# Patient Record
Sex: FEMALE | Race: BLACK OR AFRICAN AMERICAN | NOT HISPANIC OR LATINO | Employment: OTHER | URBAN - METROPOLITAN AREA
[De-identification: names, ages, dates, MRNs, and addresses within clinical notes are randomized per-mention and may not be internally consistent; named-entity substitution may affect disease eponyms.]

---

## 2018-07-16 ENCOUNTER — ANESTHESIA (OUTPATIENT)
Dept: SURGERY | Facility: HOSPITAL | Age: 53
DRG: 268 | End: 2018-07-16
Payer: MEDICARE

## 2018-07-16 ENCOUNTER — HOSPITAL ENCOUNTER (INPATIENT)
Facility: HOSPITAL | Age: 53
LOS: 2 days | Discharge: HOME | DRG: 268 | End: 2018-07-18
Attending: SURGERY | Admitting: SURGERY
Payer: MEDICARE

## 2018-07-16 ENCOUNTER — ANESTHESIA EVENT (OUTPATIENT)
Dept: SURGERY | Facility: HOSPITAL | Age: 53
DRG: 268 | End: 2018-07-16
Payer: MEDICARE

## 2018-07-16 DIAGNOSIS — E43 SEVERE PROTEIN-CALORIE MALNUTRITION: ICD-10-CM

## 2018-07-16 DIAGNOSIS — Z99.2 ESRD ON HEMODIALYSIS: Primary | ICD-10-CM

## 2018-07-16 DIAGNOSIS — I77.0: ICD-10-CM

## 2018-07-16 DIAGNOSIS — I73.9 PERIPHERAL ARTERIAL DISEASE: ICD-10-CM

## 2018-07-16 DIAGNOSIS — I50.9 CHF (CONGESTIVE HEART FAILURE): ICD-10-CM

## 2018-07-16 DIAGNOSIS — N18.6 ESRD ON HEMODIALYSIS: Primary | ICD-10-CM

## 2018-07-16 DIAGNOSIS — R10.13 ABDOMINAL PAIN, EPIGASTRIC: ICD-10-CM

## 2018-07-16 DIAGNOSIS — I71.40 ABDOMINAL AORTIC ANEURYSM (AAA) WITHOUT RUPTURE: ICD-10-CM

## 2018-07-16 DIAGNOSIS — I71.40 AAA (ABDOMINAL AORTIC ANEURYSM): ICD-10-CM

## 2018-07-16 DIAGNOSIS — N18.6 END STAGE RENAL DISEASE: ICD-10-CM

## 2018-07-16 LAB
ABO + RH BLD: NORMAL
ALBUMIN SERPL BCP-MCNC: 3.2 G/DL
ALP SERPL-CCNC: 97 U/L
ALT SERPL W/O P-5'-P-CCNC: <5 U/L
ANION GAP SERPL CALC-SCNC: 15 MMOL/L
APTT BLDCRRT: 28.4 SEC
AST SERPL-CCNC: 17 U/L
BASOPHILS # BLD AUTO: 0.02 K/UL
BASOPHILS NFR BLD: 0.5 %
BILIRUB SERPL-MCNC: 0.6 MG/DL
BLD GP AB SCN CELLS X3 SERPL QL: NORMAL
BUN SERPL-MCNC: 24 MG/DL
CALCIUM SERPL-MCNC: 8.7 MG/DL
CHLORIDE SERPL-SCNC: 98 MMOL/L
CO2 SERPL-SCNC: 21 MMOL/L
CREAT SERPL-MCNC: 7.8 MG/DL
DIFFERENTIAL METHOD: ABNORMAL
EOSINOPHIL # BLD AUTO: 0.2 K/UL
EOSINOPHIL NFR BLD: 4.3 %
ERYTHROCYTE [DISTWIDTH] IN BLOOD BY AUTOMATED COUNT: 17.8 %
EST. GFR  (AFRICAN AMERICAN): 6.2 ML/MIN/1.73 M^2
EST. GFR  (NON AFRICAN AMERICAN): 5.3 ML/MIN/1.73 M^2
GLUCOSE SERPL-MCNC: 61 MG/DL
HCT VFR BLD AUTO: 42.1 %
HGB BLD-MCNC: 12.3 G/DL
IMM GRANULOCYTES # BLD AUTO: 0.01 K/UL
IMM GRANULOCYTES NFR BLD AUTO: 0.3 %
INR PPP: 1
LYMPHOCYTES # BLD AUTO: 0.8 K/UL
LYMPHOCYTES NFR BLD: 19.4 %
MAGNESIUM SERPL-MCNC: 2.2 MG/DL
MCH RBC QN AUTO: 28 PG
MCHC RBC AUTO-ENTMCNC: 29.2 G/DL
MCV RBC AUTO: 96 FL
MONOCYTES # BLD AUTO: 0.5 K/UL
MONOCYTES NFR BLD: 11.7 %
NEUTROPHILS # BLD AUTO: 2.5 K/UL
NEUTROPHILS NFR BLD: 63.8 %
NRBC BLD-RTO: 0 /100 WBC
PHOSPHATE SERPL-MCNC: 5.3 MG/DL
PLATELET # BLD AUTO: 100 K/UL
PMV BLD AUTO: 11 FL
POTASSIUM SERPL-SCNC: 4.4 MMOL/L
PROT SERPL-MCNC: 8.8 G/DL
PROTHROMBIN TIME: 10.2 SEC
RBC # BLD AUTO: 4.4 M/UL
SODIUM SERPL-SCNC: 134 MMOL/L
WBC # BLD AUTO: 3.92 K/UL

## 2018-07-16 PROCEDURE — C1887 CATHETER, GUIDING: HCPCS | Performed by: SURGERY

## 2018-07-16 PROCEDURE — 84100 ASSAY OF PHOSPHORUS: CPT

## 2018-07-16 PROCEDURE — 85610 PROTHROMBIN TIME: CPT

## 2018-07-16 PROCEDURE — C1769 GUIDE WIRE: HCPCS | Performed by: SURGERY

## 2018-07-16 PROCEDURE — 34705 EVAC RPR A-BIILIAC NDGFT: CPT | Mod: GC,,, | Performed by: SURGERY

## 2018-07-16 PROCEDURE — D9220A PRA ANESTHESIA: Mod: ANES,,, | Performed by: ANESTHESIOLOGY

## 2018-07-16 PROCEDURE — 36000707: Performed by: SURGERY

## 2018-07-16 PROCEDURE — D9220A PRA ANESTHESIA: Mod: CRNA,,, | Performed by: NURSE ANESTHETIST, CERTIFIED REGISTERED

## 2018-07-16 PROCEDURE — 37000008 HC ANESTHESIA 1ST 15 MINUTES: Performed by: SURGERY

## 2018-07-16 PROCEDURE — 85730 THROMBOPLASTIN TIME PARTIAL: CPT

## 2018-07-16 PROCEDURE — 99223 1ST HOSP IP/OBS HIGH 75: CPT | Mod: 57,AI,, | Performed by: SURGERY

## 2018-07-16 PROCEDURE — 86850 RBC ANTIBODY SCREEN: CPT

## 2018-07-16 PROCEDURE — 25500020 PHARM REV CODE 255: Performed by: SURGERY

## 2018-07-16 PROCEDURE — 27201423 OPTIME MED/SURG SUP & DEVICES STERILE SUPPLY: Performed by: SURGERY

## 2018-07-16 PROCEDURE — 36620 INSERTION CATHETER ARTERY: CPT | Mod: 59,GC,, | Performed by: ANESTHESIOLOGY

## 2018-07-16 PROCEDURE — 83735 ASSAY OF MAGNESIUM: CPT

## 2018-07-16 PROCEDURE — C1768 GRAFT, VASCULAR: HCPCS | Performed by: SURGERY

## 2018-07-16 PROCEDURE — 63600175 PHARM REV CODE 636 W HCPCS: Performed by: SURGERY

## 2018-07-16 PROCEDURE — 25000003 PHARM REV CODE 250: Performed by: NURSE ANESTHETIST, CERTIFIED REGISTERED

## 2018-07-16 PROCEDURE — 36000706: Performed by: SURGERY

## 2018-07-16 PROCEDURE — 37000009 HC ANESTHESIA EA ADD 15 MINS: Performed by: SURGERY

## 2018-07-16 PROCEDURE — 36415 COLL VENOUS BLD VENIPUNCTURE: CPT

## 2018-07-16 PROCEDURE — 34713 PERQ ACCESS & CLSR FEM ART: CPT | Mod: RT,GC,, | Performed by: SURGERY

## 2018-07-16 PROCEDURE — C1894 INTRO/SHEATH, NON-LASER: HCPCS | Performed by: SURGERY

## 2018-07-16 PROCEDURE — C1760 CLOSURE DEV, VASC: HCPCS | Performed by: SURGERY

## 2018-07-16 PROCEDURE — 85025 COMPLETE CBC W/AUTO DIFF WBC: CPT

## 2018-07-16 PROCEDURE — C2628 CATHETER, OCCLUSION: HCPCS | Performed by: SURGERY

## 2018-07-16 PROCEDURE — 80053 COMPREHEN METABOLIC PANEL: CPT

## 2018-07-16 PROCEDURE — 63600175 PHARM REV CODE 636 W HCPCS: Performed by: NURSE ANESTHETIST, CERTIFIED REGISTERED

## 2018-07-16 PROCEDURE — 86920 COMPATIBILITY TEST SPIN: CPT

## 2018-07-16 PROCEDURE — 04V03DZ RESTRICTION OF ABDOMINAL AORTA WITH INTRALUMINAL DEVICE, PERCUTANEOUS APPROACH: ICD-10-PCS | Performed by: SURGERY

## 2018-07-16 PROCEDURE — 20600001 HC STEP DOWN PRIVATE ROOM

## 2018-07-16 DEVICE — IMPLANTABLE DEVICE: Type: IMPLANTABLE DEVICE | Site: AORTA | Status: FUNCTIONAL

## 2018-07-16 RX ORDER — FENTANYL CITRATE 50 UG/ML
INJECTION, SOLUTION INTRAMUSCULAR; INTRAVENOUS
Status: DISCONTINUED | OUTPATIENT
Start: 2018-07-16 | End: 2018-07-17

## 2018-07-16 RX ORDER — HEPARIN SODIUM 1000 [USP'U]/ML
INJECTION, SOLUTION INTRAVENOUS; SUBCUTANEOUS
Status: DISCONTINUED | OUTPATIENT
Start: 2018-07-16 | End: 2018-07-17

## 2018-07-16 RX ORDER — MIDAZOLAM HYDROCHLORIDE 1 MG/ML
INJECTION, SOLUTION INTRAMUSCULAR; INTRAVENOUS
Status: DISCONTINUED | OUTPATIENT
Start: 2018-07-16 | End: 2018-07-17

## 2018-07-16 RX ORDER — SODIUM CHLORIDE 9 MG/ML
INJECTION, SOLUTION INTRAVENOUS CONTINUOUS
Status: DISCONTINUED | OUTPATIENT
Start: 2018-07-16 | End: 2018-07-16

## 2018-07-16 RX ORDER — EPHEDRINE SULFATE 50 MG/ML
INJECTION, SOLUTION INTRAVENOUS
Status: DISCONTINUED | OUTPATIENT
Start: 2018-07-16 | End: 2018-07-17

## 2018-07-16 RX ORDER — IODIXANOL 320 MG/ML
INJECTION, SOLUTION INTRAVASCULAR
Status: DISCONTINUED | OUTPATIENT
Start: 2018-07-16 | End: 2018-07-17 | Stop reason: HOSPADM

## 2018-07-16 RX ORDER — ETOMIDATE 2 MG/ML
INJECTION INTRAVENOUS
Status: DISCONTINUED | OUTPATIENT
Start: 2018-07-16 | End: 2018-07-17

## 2018-07-16 RX ORDER — HEPARIN SODIUM 1000 [USP'U]/ML
INJECTION, SOLUTION INTRAVENOUS; SUBCUTANEOUS
Status: DISCONTINUED | OUTPATIENT
Start: 2018-07-16 | End: 2018-07-17 | Stop reason: HOSPADM

## 2018-07-16 RX ORDER — CISATRACURIUM BESYLATE 10 MG/ML
INJECTION, SOLUTION INTRAVENOUS
Status: DISCONTINUED | OUTPATIENT
Start: 2018-07-16 | End: 2018-07-17

## 2018-07-16 RX ORDER — SODIUM CHLORIDE 0.9 % (FLUSH) 0.9 %
3 SYRINGE (ML) INJECTION
Status: DISCONTINUED | OUTPATIENT
Start: 2018-07-16 | End: 2018-07-18 | Stop reason: HOSPADM

## 2018-07-16 RX ADMIN — MIDAZOLAM HYDROCHLORIDE 1 MG: 1 INJECTION, SOLUTION INTRAMUSCULAR; INTRAVENOUS at 10:07

## 2018-07-16 RX ADMIN — FENTANYL CITRATE 50 MCG: 50 INJECTION, SOLUTION INTRAMUSCULAR; INTRAVENOUS at 10:07

## 2018-07-16 RX ADMIN — EPHEDRINE SULFATE 5 MG: 50 INJECTION, SOLUTION INTRAMUSCULAR; INTRAVENOUS; SUBCUTANEOUS at 11:07

## 2018-07-16 RX ADMIN — IOHEXOL 100 ML: 350 INJECTION, SOLUTION INTRAVENOUS at 09:07

## 2018-07-16 RX ADMIN — EPHEDRINE SULFATE 10 MG: 50 INJECTION, SOLUTION INTRAMUSCULAR; INTRAVENOUS; SUBCUTANEOUS at 11:07

## 2018-07-16 RX ADMIN — CISATRACURIUM BESYLATE 6 MG: 10 INJECTION INTRAVENOUS at 10:07

## 2018-07-16 RX ADMIN — SODIUM CHLORIDE, SODIUM GLUCONATE, SODIUM ACETATE, POTASSIUM CHLORIDE, MAGNESIUM CHLORIDE, SODIUM PHOSPHATE, DIBASIC, AND POTASSIUM PHOSPHATE: .53; .5; .37; .037; .03; .012; .00082 INJECTION, SOLUTION INTRAVENOUS at 10:07

## 2018-07-16 RX ADMIN — DEXTROSE 2 G: 50 INJECTION, SOLUTION INTRAVENOUS at 11:07

## 2018-07-16 RX ADMIN — VANCOMYCIN HYDROCHLORIDE 1 G: 1 INJECTION, POWDER, LYOPHILIZED, FOR SOLUTION INTRAVENOUS at 11:07

## 2018-07-16 RX ADMIN — CISATRACURIUM BESYLATE 2 MG: 10 INJECTION INTRAVENOUS at 11:07

## 2018-07-16 RX ADMIN — CISATRACURIUM BESYLATE 2 MG: 10 INJECTION INTRAVENOUS at 10:07

## 2018-07-16 RX ADMIN — FENTANYL CITRATE 50 MCG: 50 INJECTION, SOLUTION INTRAMUSCULAR; INTRAVENOUS at 11:07

## 2018-07-16 RX ADMIN — EPHEDRINE SULFATE 5 MG: 50 INJECTION, SOLUTION INTRAMUSCULAR; INTRAVENOUS; SUBCUTANEOUS at 10:07

## 2018-07-16 RX ADMIN — ETOMIDATE 6 MG: 2 INJECTION, SOLUTION INTRAVENOUS at 10:07

## 2018-07-16 RX ADMIN — EPHEDRINE SULFATE 10 MG: 50 INJECTION, SOLUTION INTRAMUSCULAR; INTRAVENOUS; SUBCUTANEOUS at 10:07

## 2018-07-16 RX ADMIN — HEPARIN SODIUM 4000 UNITS: 1000 INJECTION, SOLUTION INTRAVENOUS; SUBCUTANEOUS at 11:07

## 2018-07-17 PROBLEM — R11.2 NON-INTRACTABLE VOMITING WITH NAUSEA: Status: ACTIVE | Noted: 2018-07-17

## 2018-07-17 PROBLEM — Z99.2 ESRD ON HEMODIALYSIS: Status: ACTIVE | Noted: 2018-07-16

## 2018-07-17 PROBLEM — R10.13 ABDOMINAL PAIN, EPIGASTRIC: Status: ACTIVE | Noted: 2018-07-17

## 2018-07-17 PROBLEM — E43 SEVERE PROTEIN-CALORIE MALNUTRITION: Status: ACTIVE | Noted: 2018-07-17

## 2018-07-17 PROBLEM — I77.0: Status: RESOLVED | Noted: 2018-07-16 | Resolved: 2018-07-17

## 2018-07-17 LAB
ALBUMIN SERPL BCP-MCNC: 2.5 G/DL
ALP SERPL-CCNC: 82 U/L
ALT SERPL W/O P-5'-P-CCNC: <5 U/L
ANION GAP SERPL CALC-SCNC: 13 MMOL/L
AST SERPL-CCNC: 15 U/L
BASOPHILS # BLD AUTO: 0.02 K/UL
BASOPHILS NFR BLD: 0.3 %
BILIRUB SERPL-MCNC: 0.5 MG/DL
BUN SERPL-MCNC: 21 MG/DL
CALCIUM SERPL-MCNC: 7.6 MG/DL
CHLORIDE SERPL-SCNC: 97 MMOL/L
CO2 SERPL-SCNC: 22 MMOL/L
CREAT SERPL-MCNC: 7.5 MG/DL
DIFFERENTIAL METHOD: ABNORMAL
EOSINOPHIL # BLD AUTO: 0.2 K/UL
EOSINOPHIL NFR BLD: 2.8 %
ERYTHROCYTE [DISTWIDTH] IN BLOOD BY AUTOMATED COUNT: 17.5 %
EST. GFR  (AFRICAN AMERICAN): 6.5 ML/MIN/1.73 M^2
EST. GFR  (NON AFRICAN AMERICAN): 5.6 ML/MIN/1.73 M^2
ESTIMATED PA SYSTOLIC PRESSURE: 36.73
GLUCOSE SERPL-MCNC: 79 MG/DL (ref 70–110)
GLUCOSE SERPL-MCNC: 90 MG/DL
HCO3 UR-SCNC: 28 MMOL/L (ref 24–28)
HCT VFR BLD AUTO: 34.1 %
HCT VFR BLD CALC: 38 %PCV (ref 36–54)
HGB BLD-MCNC: 10.3 G/DL
IMM GRANULOCYTES # BLD AUTO: 0.03 K/UL
IMM GRANULOCYTES NFR BLD AUTO: 0.5 %
LYMPHOCYTES # BLD AUTO: 1.2 K/UL
LYMPHOCYTES NFR BLD: 20.3 %
MAGNESIUM SERPL-MCNC: 2 MG/DL
MCH RBC QN AUTO: 28.2 PG
MCHC RBC AUTO-ENTMCNC: 30.2 G/DL
MCV RBC AUTO: 93 FL
MONOCYTES # BLD AUTO: 0.7 K/UL
MONOCYTES NFR BLD: 11.2 %
NEUTROPHILS # BLD AUTO: 3.8 K/UL
NEUTROPHILS NFR BLD: 64.9 %
NRBC BLD-RTO: 0 /100 WBC
PCO2 BLDA: 53 MMHG (ref 35–45)
PH SMN: 7.33 [PH] (ref 7.35–7.45)
PHOSPHATE SERPL-MCNC: 5.2 MG/DL
PLATELET # BLD AUTO: 89 K/UL
PMV BLD AUTO: 10.7 FL
PO2 BLDA: 189 MMHG (ref 80–100)
POC ACTIVATED CLOTTING TIME K: 142 SEC (ref 74–137)
POC ACTIVATED CLOTTING TIME K: 147 SEC (ref 74–137)
POC ACTIVATED CLOTTING TIME K: 263 SEC (ref 74–137)
POC ACTIVATED CLOTTING TIME K: 274 SEC (ref 74–137)
POC BE: 2 MMOL/L
POC IONIZED CALCIUM: 0.95 MMOL/L (ref 1.06–1.42)
POC SATURATED O2: 100 % (ref 95–100)
POC TCO2: 30 MMOL/L (ref 23–27)
POCT GLUCOSE: 164 MG/DL (ref 70–110)
POCT GLUCOSE: 37 MG/DL (ref 70–110)
POCT GLUCOSE: 45 MG/DL (ref 70–110)
POCT GLUCOSE: 56 MG/DL (ref 70–110)
POCT GLUCOSE: 65 MG/DL (ref 70–110)
POCT GLUCOSE: 82 MG/DL (ref 70–110)
POCT GLUCOSE: 82 MG/DL (ref 70–110)
POCT GLUCOSE: 87 MG/DL (ref 70–110)
POCT GLUCOSE: 92 MG/DL (ref 70–110)
POCT GLUCOSE: 99 MG/DL (ref 70–110)
POTASSIUM BLD-SCNC: 4.2 MMOL/L (ref 3.5–5.1)
POTASSIUM SERPL-SCNC: 4 MMOL/L
PREALB SERPL-MCNC: 14 MG/DL
PROT SERPL-MCNC: 6.9 G/DL
RBC # BLD AUTO: 3.65 M/UL
RETIRED EF AND QEF - SEE NOTES: 55 (ref 55–65)
SAMPLE: ABNORMAL
SODIUM BLD-SCNC: 134 MMOL/L (ref 136–145)
SODIUM SERPL-SCNC: 132 MMOL/L
TRANSFERRIN SERPL-MCNC: 121 MG/DL
TRICUSPID VALVE REGURGITATION: NORMAL
WBC # BLD AUTO: 5.8 K/UL

## 2018-07-17 PROCEDURE — 93306 TTE W/DOPPLER COMPLETE: CPT

## 2018-07-17 PROCEDURE — 20600001 HC STEP DOWN PRIVATE ROOM

## 2018-07-17 PROCEDURE — 25000003 PHARM REV CODE 250: Performed by: NURSE PRACTITIONER

## 2018-07-17 PROCEDURE — 76700 US EXAM ABDOM COMPLETE: CPT | Performed by: SURGERY

## 2018-07-17 PROCEDURE — 25000003 PHARM REV CODE 250: Performed by: SURGERY

## 2018-07-17 PROCEDURE — S0028 INJECTION, FAMOTIDINE, 20 MG: HCPCS | Performed by: NURSE ANESTHETIST, CERTIFIED REGISTERED

## 2018-07-17 PROCEDURE — 93306 TTE W/DOPPLER COMPLETE: CPT | Mod: 26,,, | Performed by: INTERNAL MEDICINE

## 2018-07-17 PROCEDURE — 63600175 PHARM REV CODE 636 W HCPCS: Performed by: NURSE ANESTHETIST, CERTIFIED REGISTERED

## 2018-07-17 PROCEDURE — 84466 ASSAY OF TRANSFERRIN: CPT

## 2018-07-17 PROCEDURE — 85025 COMPLETE CBC W/AUTO DIFF WBC: CPT

## 2018-07-17 PROCEDURE — 83735 ASSAY OF MAGNESIUM: CPT

## 2018-07-17 PROCEDURE — 63600175 PHARM REV CODE 636 W HCPCS: Performed by: SURGERY

## 2018-07-17 PROCEDURE — 36415 COLL VENOUS BLD VENIPUNCTURE: CPT

## 2018-07-17 PROCEDURE — 99223 1ST HOSP IP/OBS HIGH 75: CPT | Mod: ,,, | Performed by: SURGERY

## 2018-07-17 PROCEDURE — S0028 INJECTION, FAMOTIDINE, 20 MG: HCPCS | Performed by: SURGERY

## 2018-07-17 PROCEDURE — 94799 UNLISTED PULMONARY SVC/PX: CPT

## 2018-07-17 PROCEDURE — 80053 COMPREHEN METABOLIC PANEL: CPT

## 2018-07-17 PROCEDURE — 84100 ASSAY OF PHOSPHORUS: CPT

## 2018-07-17 PROCEDURE — 99233 SBSQ HOSP IP/OBS HIGH 50: CPT | Mod: ,,, | Performed by: NURSE PRACTITIONER

## 2018-07-17 PROCEDURE — 84134 ASSAY OF PREALBUMIN: CPT

## 2018-07-17 PROCEDURE — 25000003 PHARM REV CODE 250: Performed by: NURSE ANESTHETIST, CERTIFIED REGISTERED

## 2018-07-17 RX ORDER — SEVELAMER CARBONATE 800 MG/1
800 TABLET, FILM COATED ORAL
Status: DISCONTINUED | OUTPATIENT
Start: 2018-07-17 | End: 2018-07-18 | Stop reason: HOSPADM

## 2018-07-17 RX ORDER — IBUPROFEN 200 MG
24 TABLET ORAL
Status: DISCONTINUED | OUTPATIENT
Start: 2018-07-17 | End: 2018-07-18 | Stop reason: HOSPADM

## 2018-07-17 RX ORDER — LABETALOL HYDROCHLORIDE 5 MG/ML
10 INJECTION, SOLUTION INTRAVENOUS EVERY 4 HOURS PRN
Status: DISCONTINUED | OUTPATIENT
Start: 2018-07-17 | End: 2018-07-18 | Stop reason: HOSPADM

## 2018-07-17 RX ORDER — GLUCAGON 1 MG
1 KIT INJECTION
Status: DISCONTINUED | OUTPATIENT
Start: 2018-07-17 | End: 2018-07-18 | Stop reason: HOSPADM

## 2018-07-17 RX ORDER — NEOSTIGMINE METHYLSULFATE 1 MG/ML
INJECTION, SOLUTION INTRAVENOUS
Status: DISCONTINUED | OUTPATIENT
Start: 2018-07-17 | End: 2018-07-17

## 2018-07-17 RX ORDER — FAMOTIDINE 10 MG/ML
20 INJECTION INTRAVENOUS DAILY
Status: DISCONTINUED | OUTPATIENT
Start: 2018-07-17 | End: 2018-07-18

## 2018-07-17 RX ORDER — PROTAMINE SULFATE 10 MG/ML
INJECTION, SOLUTION INTRAVENOUS
Status: DISCONTINUED | OUTPATIENT
Start: 2018-07-17 | End: 2018-07-17

## 2018-07-17 RX ORDER — FAMOTIDINE 10 MG/ML
20 INJECTION INTRAVENOUS 2 TIMES DAILY
Status: DISCONTINUED | OUTPATIENT
Start: 2018-07-17 | End: 2018-07-17

## 2018-07-17 RX ORDER — CALCIUM CHLORIDE INJECTION 100 MG/ML
1 INJECTION, SOLUTION INTRAVENOUS ONCE
Status: DISCONTINUED | OUTPATIENT
Start: 2018-07-17 | End: 2018-07-17

## 2018-07-17 RX ORDER — GLYCOPYRROLATE 0.2 MG/ML
INJECTION INTRAMUSCULAR; INTRAVENOUS
Status: DISCONTINUED | OUTPATIENT
Start: 2018-07-17 | End: 2018-07-17

## 2018-07-17 RX ORDER — INSULIN ASPART 100 [IU]/ML
1-10 INJECTION, SOLUTION INTRAVENOUS; SUBCUTANEOUS
Status: DISCONTINUED | OUTPATIENT
Start: 2018-07-17 | End: 2018-07-18 | Stop reason: HOSPADM

## 2018-07-17 RX ORDER — ONDANSETRON 2 MG/ML
INJECTION INTRAMUSCULAR; INTRAVENOUS
Status: DISCONTINUED | OUTPATIENT
Start: 2018-07-17 | End: 2018-07-17

## 2018-07-17 RX ORDER — HYDROCODONE BITARTRATE AND ACETAMINOPHEN 5; 325 MG/1; MG/1
1 TABLET ORAL EVERY 4 HOURS PRN
Status: DISCONTINUED | OUTPATIENT
Start: 2018-07-17 | End: 2018-07-18 | Stop reason: HOSPADM

## 2018-07-17 RX ORDER — CEFAZOLIN SODIUM 1 G/3ML
2 INJECTION, POWDER, FOR SOLUTION INTRAMUSCULAR; INTRAVENOUS
Status: COMPLETED | OUTPATIENT
Start: 2018-07-17 | End: 2018-07-17

## 2018-07-17 RX ORDER — AMLODIPINE BESYLATE 5 MG/1
5 TABLET ORAL DAILY
Status: DISCONTINUED | OUTPATIENT
Start: 2018-07-17 | End: 2018-07-18 | Stop reason: HOSPADM

## 2018-07-17 RX ORDER — MUPIROCIN 20 MG/G
1 OINTMENT TOPICAL 2 TIMES DAILY
Status: DISCONTINUED | OUTPATIENT
Start: 2018-07-17 | End: 2018-07-18 | Stop reason: HOSPADM

## 2018-07-17 RX ORDER — PROPOFOL 10 MG/ML
VIAL (ML) INTRAVENOUS
Status: DISCONTINUED | OUTPATIENT
Start: 2018-07-17 | End: 2018-07-17

## 2018-07-17 RX ORDER — FAMOTIDINE 10 MG/ML
INJECTION INTRAVENOUS
Status: DISCONTINUED | OUTPATIENT
Start: 2018-07-17 | End: 2018-07-17

## 2018-07-17 RX ORDER — ASPIRIN 81 MG/1
81 TABLET ORAL DAILY
Status: DISCONTINUED | OUTPATIENT
Start: 2018-07-17 | End: 2018-07-18 | Stop reason: HOSPADM

## 2018-07-17 RX ORDER — ATORVASTATIN CALCIUM 20 MG/1
40 TABLET, FILM COATED ORAL DAILY
Status: DISCONTINUED | OUTPATIENT
Start: 2018-07-17 | End: 2018-07-18 | Stop reason: HOSPADM

## 2018-07-17 RX ORDER — IBUPROFEN 200 MG
16 TABLET ORAL
Status: DISCONTINUED | OUTPATIENT
Start: 2018-07-17 | End: 2018-07-18 | Stop reason: HOSPADM

## 2018-07-17 RX ADMIN — MUPIROCIN 1 G: 20 OINTMENT TOPICAL at 08:07

## 2018-07-17 RX ADMIN — MUPIROCIN 1 G: 20 OINTMENT TOPICAL at 09:07

## 2018-07-17 RX ADMIN — CEFAZOLIN 2 G: 1 INJECTION, POWDER, FOR SOLUTION INTRAMUSCULAR; INTRAVENOUS at 06:07

## 2018-07-17 RX ADMIN — CEFAZOLIN 2 G: 1 INJECTION, POWDER, FOR SOLUTION INTRAMUSCULAR; INTRAVENOUS at 03:07

## 2018-07-17 RX ADMIN — GLYCOPYRROLATE 0.4 MG: 0.2 INJECTION, SOLUTION INTRAMUSCULAR; INTRAVENOUS at 12:07

## 2018-07-17 RX ADMIN — SODIUM CHLORIDE, SODIUM GLUCONATE, SODIUM ACETATE, POTASSIUM CHLORIDE, MAGNESIUM CHLORIDE, SODIUM PHOSPHATE, DIBASIC, AND POTASSIUM PHOSPHATE: .53; .5; .37; .037; .03; .012; .00082 INJECTION, SOLUTION INTRAVENOUS at 12:07

## 2018-07-17 RX ADMIN — DEXTROSE MONOHYDRATE 12.5 G: 25 INJECTION, SOLUTION INTRAVENOUS at 02:07

## 2018-07-17 RX ADMIN — SEVELAMER CARBONATE 800 MG: 800 TABLET, FILM COATED ORAL at 12:07

## 2018-07-17 RX ADMIN — PROTAMINE SULFATE 15 MG: 10 INJECTION, SOLUTION INTRAVENOUS at 12:07

## 2018-07-17 RX ADMIN — ONDANSETRON 4 MG: 2 INJECTION INTRAMUSCULAR; INTRAVENOUS at 12:07

## 2018-07-17 RX ADMIN — ATORVASTATIN CALCIUM 40 MG: 20 TABLET, FILM COATED ORAL at 09:07

## 2018-07-17 RX ADMIN — AMLODIPINE BESYLATE 5 MG: 5 TABLET ORAL at 09:07

## 2018-07-17 RX ADMIN — PROPOFOL 30 MG: 10 INJECTION, EMULSION INTRAVENOUS at 12:07

## 2018-07-17 RX ADMIN — ASPIRIN 81 MG: 81 TABLET, COATED ORAL at 09:07

## 2018-07-17 RX ADMIN — PROTAMINE SULFATE 10 MG: 10 INJECTION, SOLUTION INTRAVENOUS at 12:07

## 2018-07-17 RX ADMIN — EPHEDRINE SULFATE 10 MG: 50 INJECTION, SOLUTION INTRAMUSCULAR; INTRAVENOUS; SUBCUTANEOUS at 12:07

## 2018-07-17 RX ADMIN — FAMOTIDINE 20 MG: 10 INJECTION, SOLUTION INTRAVENOUS at 09:07

## 2018-07-17 RX ADMIN — EPHEDRINE SULFATE 5 MG: 50 INJECTION, SOLUTION INTRAMUSCULAR; INTRAVENOUS; SUBCUTANEOUS at 01:07

## 2018-07-17 RX ADMIN — PROTAMINE SULFATE 5 MG: 10 INJECTION, SOLUTION INTRAVENOUS at 12:07

## 2018-07-17 RX ADMIN — FAMOTIDINE 20 MG: 10 INJECTION, SOLUTION INTRAVENOUS at 12:07

## 2018-07-17 RX ADMIN — FENTANYL CITRATE 100 MCG: 50 INJECTION, SOLUTION INTRAMUSCULAR; INTRAVENOUS at 12:07

## 2018-07-17 RX ADMIN — NEOSTIGMINE METHYLSULFATE 3.5 MG: 1 INJECTION INTRAVENOUS at 12:07

## 2018-07-17 RX ADMIN — EPHEDRINE SULFATE 10 MG: 50 INJECTION, SOLUTION INTRAMUSCULAR; INTRAVENOUS; SUBCUTANEOUS at 01:07

## 2018-07-17 RX ADMIN — SEVELAMER CARBONATE 800 MG: 800 TABLET, FILM COATED ORAL at 04:07

## 2018-07-17 RX ADMIN — LABETALOL HYDROCHLORIDE 10 MG: 5 INJECTION, SOLUTION INTRAVENOUS at 10:07

## 2018-07-17 RX ADMIN — PROPOFOL 50 MG: 10 INJECTION, EMULSION INTRAVENOUS at 12:07

## 2018-07-17 NOTE — PLAN OF CARE
Pt had hh in past but can't remember name of agency so Saul will contact MELANIE Zheng with the name of hh agency used  esrd- on HD at dialysis center in [place].  Can't remember name of HD center but they transport her to /from  On MWF    No future appointments.     07/17/18 1315   Discharge Assessment   Assessment Type Discharge Planning Assessment   Confirmed/corrected address and phone number on facesheet? Yes   Assessment information obtained from? Patient   Expected Length of Stay (days) 3   Communicated expected length of stay with patient/caregiver yes   Prior to hospitilization cognitive status: Alert/Oriented   Prior to hospitalization functional status: Independent   Current cognitive status: Alert/Oriented   Current Functional Status: Independent   Lives With child(yuliana), adult   Able to Return to Prior Arrangements yes   Is patient able to care for self after discharge? Yes   Who are your caregiver(s) and their phone number(s)? pamela  daughter   [phone]    Readmission Within The Last 30 Days no previous admission in last 30 days   Patient currently being followed by outpatient case management? No   Patient currently receives any other outside agency services? No   Do you have any problems affording any of your prescribed medications? No   Is the patient taking medications as prescribed? yes   Does the patient have transportation home? Yes   Does the patient receive services at the [facility]? No   Discharge Plan A Home with family;Home Health   Discharge Plan B Home with family;Home Health   Patient/Family In Agreement With Plan yes

## 2018-07-17 NOTE — SUBJECTIVE & OBJECTIVE
Past Medical History:   Diagnosis Date    Current smoker 07/16/2018       No past surgical history on file.    Review of patient's allergies indicates:  No Known Allergies  Current Facility-Administered Medications   Medication Frequency    amLODIPine tablet 5 mg Daily    aspirin EC tablet 81 mg Daily    atorvastatin tablet 40 mg Daily    ceFAZolin injection 2 g Q8H    dextrose 50% injection 12.5 g PRN    dextrose 50% injection 25 g PRN    famotidine (PF) injection 20 mg Daily    glucagon (human recombinant) injection 1 mg PRN    glucose chewable tablet 16 g PRN    glucose chewable tablet 24 g PRN    HYDROcodone-acetaminophen 5-325 mg per tablet 1 tablet Q4H PRN    insulin aspart U-100 pen 1-10 Units QID (AC + HS) PRN    labetalol injection 10 mg Q4H PRN    mupirocin 2 % ointment 1 g BID    sodium chloride 0.9% flush 3 mL PRN     Family History     None        Social History Main Topics    Smoking status: Not on file    Smokeless tobacco: Not on file    Alcohol use Not on file    Drug use: Unknown    Sexual activity: Not on file     Review of Systems   Constitutional: Negative for activity change, chills, fatigue and fever.   HENT: Negative for congestion, rhinorrhea, sinus pain and sinus pressure.    Respiratory: Negative for cough, chest tightness, shortness of breath and wheezing.    Cardiovascular: Negative for chest pain, palpitations and leg swelling.   Gastrointestinal: Positive for abdominal pain. Negative for abdominal distention, constipation, diarrhea, nausea and vomiting.   Musculoskeletal: Positive for back pain. Negative for arthralgias, myalgias, neck pain and neck stiffness.   Skin: Negative for color change, pallor and rash.   Neurological: Negative for dizziness, facial asymmetry and headaches.   Psychiatric/Behavioral: Negative for agitation, behavioral problems and confusion.     Objective:     Vital Signs (Most Recent):  Temp: 98.2 °F (36.8 °C) (07/17/18 0715)  Pulse: 66  (07/17/18 0915)  Resp: 18 (07/17/18 0915)  BP: 128/77 (07/17/18 0900)  SpO2: 100 % (07/17/18 0915)  O2 Device (Oxygen Therapy): nasal cannula (07/17/18 0900) Vital Signs (24h Range):  Temp:  [96.2 °F (35.7 °C)-98.7 °F (37.1 °C)] 98.2 °F (36.8 °C)  Pulse:  [58-82] 66  Resp:  [11-65] 18  SpO2:  [93 %-100 %] 100 %  BP: (123-151)/(70-89) 128/77  Arterial Line BP: (121-159)/(64-85) 159/85        There is no height or weight on file to calculate BMI.  There is no height or weight on file to calculate BSA.    I/O last 3 completed shifts:  In: 1414.4 [I.V.:1414.4]  Out: -     Physical Exam   Constitutional: She is oriented to person, place, and time. She appears well-developed. No distress.   HENT:   Head: Normocephalic and atraumatic.   Right Ear: External ear normal.   Left Ear: External ear normal.   Eyes: Conjunctivae and EOM are normal. Right eye exhibits no discharge. Left eye exhibits no discharge.   Neck: Normal range of motion. Neck supple.   Cardiovascular: Normal rate and regular rhythm.  Exam reveals no gallop and no friction rub.    Murmur heard.  Pulmonary/Chest: Effort normal and breath sounds normal. No respiratory distress. She has no wheezes. She has no rales.   Abdominal: Soft. She exhibits no distension. There is no tenderness.   Musculoskeletal: She exhibits no edema or deformity.   Neurological: She is alert and oriented to person, place, and time.   Skin: Skin is warm and dry. She is not diaphoretic.   AVG to L thigh.  ++ bruit/thrill   Psychiatric: She has a normal mood and affect. Her behavior is normal.       Significant Labs:  CBC:   Recent Labs  Lab 07/17/18 0136   WBC 5.80   RBC 3.65*   HGB 10.3*   HCT 34.1*   PLT 89*   MCV 93   MCH 28.2   MCHC 30.2*     CMP:   Recent Labs  Lab 07/17/18 0136   GLU 90   CALCIUM 7.6*   ALBUMIN 2.5*   PROT 6.9   *   K 4.0   CO2 22*   CL 97   BUN 21*   CREATININE 7.5*   ALKPHOS 82   ALT <5*   AST 15   BILITOT 0.5

## 2018-07-17 NOTE — CONSULTS
[facility]  Nephrology  Consult Note    Patient Name: Tony De Leon  MRN: [id number]  Admission Date: 7/16/2018  Hospital Length of Stay: 1 days  Attending Provider: Good Westfall MD   Primary Care Physician: Primary Doctor No  Principal Problem:<principal problem not specified>    Inpatient consult to Nephrology  Consult performed by: CASIE HUGHES  Consult ordered by: SUSAN ENCARNACION  Reason for consult: ESRD on iHD MWF        Subjective:     HPI: Ms. De Leon is a 51 yo AAF with PMHx of HTN, PAD, and ESRD on iHD MWF who presented to an OSH for worsening abdominal and back pain. She reports that the pain has been progressively getting worse x 3 weeks which caused her to seek further care. CT imaging at the OSH revealed AAA so she was transferred to [facility] for further evaluation.  CT imaging performed at [facility] revealed a 4.5 cm retroperitoneal hematoma with active contrast extravasation related to ruptured infrarenal aorta.  Vascular surgery was consulted and patient underwent emergent percutaneous EVAR and transferred to ICU post procedure for further monitoring.  Nephrology was consulted for ESRD management.    She dialyzes in [place], [place] and reports being on dialysis for around 10 years.  She has a history of Kidney transplant, believes to be around 2005, which she reports failed and she had to undergo transplant nephrectomy due to infection.  She was transitioned to peritoneal dialysis, but developed peritonitis and had to be transitioned to HD.  She normally dialyzes on a MWF schedule, unsure of her EDW, but believes it is around 40 kg.  She has an AVG to her L thigh.  She no longer has residual renal function.    Past Medical History:   Diagnosis Date    Current smoker 07/16/2018       No past surgical history on file.    Review of patient's allergies indicates:  No Known Allergies  Current Facility-Administered Medications   Medication Frequency    amLODIPine tablet 5 mg Daily     aspirin EC tablet 81 mg Daily    atorvastatin tablet 40 mg Daily    ceFAZolin injection 2 g Q8H    dextrose 50% injection 12.5 g PRN    dextrose 50% injection 25 g PRN    famotidine (PF) injection 20 mg Daily    glucagon (human recombinant) injection 1 mg PRN    glucose chewable tablet 16 g PRN    glucose chewable tablet 24 g PRN    HYDROcodone-acetaminophen 5-325 mg per tablet 1 tablet Q4H PRN    insulin aspart U-100 pen 1-10 Units QID (AC + HS) PRN    labetalol injection 10 mg Q4H PRN    mupirocin 2 % ointment 1 g BID    sodium chloride 0.9% flush 3 mL PRN     Family History     None        Social History Main Topics    Smoking status: Not on file    Smokeless tobacco: Not on file    Alcohol use Not on file    Drug use: Unknown    Sexual activity: Not on file     Review of Systems   Constitutional: Negative for activity change, chills, fatigue and fever.   HENT: Negative for congestion, rhinorrhea, sinus pain and sinus pressure.    Respiratory: Negative for cough, chest tightness, shortness of breath and wheezing.    Cardiovascular: Negative for chest pain, palpitations and leg swelling.   Gastrointestinal: Positive for abdominal pain. Negative for abdominal distention, constipation, diarrhea, nausea and vomiting.   Musculoskeletal: Positive for back pain. Negative for arthralgias, myalgias, neck pain and neck stiffness.   Skin: Negative for color change, pallor and rash.   Neurological: Negative for dizziness, facial asymmetry and headaches.   Psychiatric/Behavioral: Negative for agitation, behavioral problems and confusion.     Objective:     Vital Signs (Most Recent):  Temp: 98.2 °F (36.8 °C) (07/17/18 0715)  Pulse: 66 (07/17/18 0915)  Resp: 18 (07/17/18 0915)  BP: 128/77 (07/17/18 0900)  SpO2: 100 % (07/17/18 0915)  O2 Device (Oxygen Therapy): nasal cannula (07/17/18 0900) Vital Signs (24h Range):  Temp:  [96.2 °F (35.7 °C)-98.7 °F (37.1 °C)] 98.2 °F (36.8 °C)  Pulse:  [58-82] 66  Resp:   [11-65] 18  SpO2:  [93 %-100 %] 100 %  BP: (123-151)/(70-89) 128/77  Arterial Line BP: (121-159)/(64-85) 159/85        There is no height or weight on file to calculate BMI.  There is no height or weight on file to calculate BSA.    I/O last 3 completed shifts:  In: 1414.4 [I.V.:1414.4]  Out: -     Physical Exam   Constitutional: She is oriented to person, place, and time. She appears well-developed. No distress.   HENT:   Head: Normocephalic and atraumatic.   Right Ear: External ear normal.   Left Ear: External ear normal.   Eyes: Conjunctivae and EOM are normal. Right eye exhibits no discharge. Left eye exhibits no discharge.   Neck: Normal range of motion. Neck supple.   Cardiovascular: Normal rate and regular rhythm.  Exam reveals no gallop and no friction rub.    Murmur heard.  Pulmonary/Chest: Effort normal and breath sounds normal. No respiratory distress. She has no wheezes. She has no rales.   Abdominal: Soft. She exhibits no distension. There is no tenderness.   Musculoskeletal: She exhibits no edema or deformity.   Neurological: She is alert and oriented to person, place, and time.   Skin: Skin is warm and dry. She is not diaphoretic.   AVG to L thigh.  ++ bruit/thrill   Psychiatric: She has a normal mood and affect. Her behavior is normal.       Significant Labs:  CBC:   Recent Labs  Lab 07/17/18  0136   WBC 5.80   RBC 3.65*   HGB 10.3*   HCT 34.1*   PLT 89*   MCV 93   MCH 28.2   MCHC 30.2*     CMP:   Recent Labs  Lab 07/17/18  0136   GLU 90   CALCIUM 7.6*   ALBUMIN 2.5*   PROT 6.9   *   K 4.0   CO2 22*   CL 97   BUN 21*   CREATININE 7.5*   ALKPHOS 82   ALT <5*   AST 15   BILITOT 0.5           Assessment/Plan:     End stage renal disease    ESRD on iHD MWF  [facility]  AVG to L thigh.  No residual renal function      Plan/Recommendations:  Will obtain outpatient records for continued care.  No emergent need for RRT today (reports last HD treatment yesterday prior to transfer).  Will continue MWF  schedule while in-patient.    Please notify us for any acute changes needing emergent dialysis.  Renal diet  Renvela 800 mg PO TID with meals    Staff attestation to follow.          Von Arroyo NP  Nephrology  [facility]  Pager:  [phone]

## 2018-07-17 NOTE — PROGRESS NOTES
[facility]  Vascular Surgery  Progress Note    Patient Name: Tony De Leon  MRN: [id number]  Admission Date: 7/16/2018  Primary Care Provider: Primary Doctor No    Subjective:     Interval History: OR overnight. No acute events since OR    Post-Op Info:  Procedure(s) (LRB):  Repair-Aneurysm-Abdominal Aortic-Endovascular (Aaa) 9.2min flouro time .09 (Right)  INSERTION, VENOUS ACCESS PORT (Right)  AORTOGRAM (N/A)  CLOSURE (Right)   1 Day Post-Op     No prescriptions prior to admission.       Review of patient's allergies indicates:  No Known Allergies    Past Medical History:   Diagnosis Date    Current smoker 07/16/2018     No past surgical history on file.  Family History     None        Social History Main Topics    Smoking status: Not on file    Smokeless tobacco: Not on file    Alcohol use Not on file    Drug use: Unknown    Sexual activity: Not on file     Review of Systems   Unable to perform ROS: Other     Objective:     Vital Signs (Most Recent):  Temp: 98.4 °F (36.9 °C) (07/17/18 0530)  Pulse: 72 (07/17/18 0600)  Resp: 12 (07/17/18 0400)  BP: 136/82 (07/17/18 0600)  SpO2: 97 % (07/17/18 0600) Vital Signs (24h Range):  Temp:  [96.2 °F (35.7 °C)-98.7 °F (37.1 °C)] 98.4 °F (36.9 °C)  Pulse:  [58-82] 72  Resp:  [11-20] 12  SpO2:  [93 %-100 %] 97 %  BP: (123-146)/(70-89) 136/82  Arterial Line BP: (121-144)/(70-73) 137/71        There is no height or weight on file to calculate BMI.    Physical Exam   Constitutional: She appears cachectic.   Chronically ill appearing, malnurished   HENT:   Head: Normocephalic.   Eyes: Pupils are equal, round, and reactive to light.   Cardiovascular: Normal rate and regular rhythm.    2+ femoral pulses, nonpalpable distal signals  Bilateral groin access sites without hematoma  Biphasic DP/PT bilaterally  Left femoral AVG with thrill   Pulmonary/Chest: Effort normal. No respiratory distress.   Abdominal: Soft.   Non-peritoneal, mild pulsatility over  the aorta, mild tenderness     Neurological: She is alert.   Oriented to self   Skin: Skin is warm and dry.       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  5.8 cm symptomatic saccular infrarenal AAA likely from DAVID    POD 1 Emergent percutaneous EVAR with Endurant 23 x 49mm aortic cuff    Stable neuro exam. C/o abdominal pain intermittently but difficult to obtain clear oriented answer.   Otherwise abdomen is soft.     Will obtain AAA US today.   If US shows successful repair, plan to transfer to floor under medicine service.                 Amauri Benitez MD  Vascular Surgery  [facility]

## 2018-07-17 NOTE — H&P
[facility]  Critical Care - Surgery  History & Physical    Patient Name: Tony De Leon  MRN: [id number]  Admission Date: 7/16/2018  Code Status: Full Code  Attending Physician: Good Westfall MD   Primary Care Provider: Primary Doctor No   Principal Problem: <principal problem not specified>    Subjective:     HPI:  Mrs. De Leon is a 53 yo F with history of HTN, ESRD on HD presenting with one week of epigastric abdominal pain. She has also had back pain, which has been present for up to a year. The epigastric pain was sharp and stabbing and worse with food. She has had subjective weight loss over the past month.    Dr. Jacob spoke with her daughter who reported that Mrs. De Leon missed HD two weeks ago for several appointments. She had N/V and back pain, which improved when she returned to HD. The abdominal pain and back pain returned again two days ago and she came to an outside hospital, where a CT was performed showing a AAA. She was then transferred to [facility] [place]. The daughter states that they were aware of the AA some six months prior, but were told the plan was to observe. She was last dialyzed in the outside hospital prior to transfer.     Her , Saul, a , is at bedside. He endorses her having a prior renal transplant from [id number], which had to be removed secondary to infection. She has a history of spontaneous bacterial peritonitis from a period when she was on peritoneal dialysis. She has CHF. Surgical history significant for PD catheter, renal transplant and explant, tubal ligation, IVC filter unknown when placed. Per daughter patient fatigues easily and is unable to walk a block or up a flight of stairs.  No known MI/stroke. Also is noncompliant with her medications at home. On norvasc, lisinopril, labetolol per daughter.    Here at [facility], she was found to have infra-renal 5.8 cm saccular aneurysm with rupture. She underwent EVAR via right CFA with Endurant  23 x 49mm aortic cuff. She was brought to the SICU extubated on simple face mask with 8L oxygen. She was RASS -2.           Hospital/ICU Course:  7/16-17: EVAR for saccular AAA    Follow-up For: Procedure(s) (LRB):  Repair-Aneurysm-Abdominal Aortic-Endovascular (Aaa) 9.2min flouro time .09 (Right)  INSERTION, VENOUS ACCESS PORT (Right)  AORTOGRAM (N/A)  CLOSURE (Right)    Post-Operative Day: 1 Day Post-Op    Objective:     Vital Signs (Most Recent):  Temp: 96.2 °F (35.7 °C) (07/16/18 1950)  Pulse: 61 (07/17/18 0200)  Resp: 18 (07/16/18 1950)  BP: 131/77 (07/17/18 0200)  SpO2: (!) 94 % (07/17/18 0200) Vital Signs (24h Range):  Temp:  [96.2 °F (35.7 °C)-98.7 °F (37.1 °C)] 96.2 °F (35.7 °C)  Pulse:  [58-67] 61  Resp:  [18-20] 18  SpO2:  [94 %-100 %] 94 %  BP: (131-146)/(70-89) 131/77  Arterial Line BP: (129-141)/(70-73) 141/73        There is no height or weight on file to calculate BMI.      Intake/Output Summary (Last 24 hours) at 07/17/18 0207  Last data filed at 07/17/18 0118   Gross per 24 hour   Intake             1400 ml   Output                0 ml   Net             1400 ml       Physical Exam   Constitutional: No distress.   Cachetic, appears older than age   HENT:   Head: Normocephalic and atraumatic.   Right Ear: External ear normal.   Left Ear: External ear normal.   Eyes: Right eye exhibits no discharge. Left eye exhibits no discharge.   Pupils 1-2mm and reactive   Cardiovascular: Normal rate, regular rhythm and intact distal pulses.  Exam reveals no gallop and no friction rub.    Murmur heard.  Systolic murmur heard most over pulmonic and mitral valve areas   Pulmonary/Chest: Effort normal and breath sounds normal. No respiratory distress. She has no wheezes. She has no rales. She exhibits no tenderness.   Simple air mask in place   Abdominal: Soft. She exhibits no distension and no mass. There is no tenderness. There is no rebound and no guarding. No hernia.   Midline surgical scar, L scar from  kidney txp, scar from former PD Cath site; L AV fistula; R AV graft for dialysis access; Minimal bowel sounds   Musculoskeletal: She exhibits no edema or tenderness.   Neurological:   RASS -2   Skin: Skin is warm and dry. No rash noted. She is not diaphoretic. No erythema. No pallor.   Nursing note and vitals reviewed.    ROS: unable to participate at this time    Vents:  Oxygen Concentration (%): 4 (07/16/18 1950)    Lines/Drains/Airways     Arterial Line                 Arterial Line 07/16/18 2225 Right Radial less than 1 day          Peripheral Intravenous Line                 Peripheral IV - Single Lumen Right Other -- days         Peripheral IV - Single Lumen 07/16/18 2211 Right Antecubital less than 1 day         Peripheral IV - Single Lumen 07/16/18 2211 Right Forearm less than 1 day                Significant Labs:    CBC/Anemia Profile:    Recent Labs  Lab 07/16/18 2229 07/17/18  0136   WBC 3.92 5.80   HGB 12.3 10.3*   HCT 42.1 34.1*   * 89*   MCV 96 93   RDW 17.8* 17.5*        Chemistries:    Recent Labs  Lab 07/16/18 2229   *   K 4.4   CL 98   CO2 21*   BUN 24*   CREATININE 7.8*   CALCIUM 8.7   ALBUMIN 3.2*   PROT 8.8*   BILITOT 0.6   ALKPHOS 97   ALT <5*   AST 17   MG 2.2   PHOS 5.3*       All pertinent labs within the past 24 hours have been reviewed.    Significant Imaging:  I have reviewed and interpreted all pertinent imaging results/findings within the past 24 hours.     CT abd/pelvis (7/16):  4.5 cm retroperitoneal hematoma with active contrast extravasation secondary to ruptured infrarenal aorta. There is a smaller focus of discrete aortic rupture versus pseudoaneurysm above the level of the bifurcation.    Fusiform aneurysmal dilatation of the suprarenal abdominal aorta measuring up to 3.0 cm.    Extensive atherosclerosis with postoperative change of partially visualized bilateral femoral bypass and left common femoral artery stent. Note is made that the right femoral bypass  appears thrombosed.    Bilateral chronic medical renal disease.    Significantly dilated pelvic veins, which can be seen in the setting of pelvic congestion syndrome.    Significantly abnormal appearance of the lower IVC and bilateral iliac veins, noting focal stenosis below the level of the IVC filter. Dense contrast opacification of the lower extremity and iliac vein on this arterial phase exam could be related to   presence of AV fistula, correlation recommended.    Well-circumscribed right sacroiliac lesion with marginal sclerosis and internal chondroid matrix. Benign etiology is favored over malignancy, but recommend correlation with prior imaging to evaluate for stability.     Assessment/Plan:     AAA (abdominal aortic aneurysm)    Activity  Status: Supine in bed, post-op  Plan: Consult PT/OT    Neuro  Status: RASS -2. No apparent pain. Dilaudid-tylenol prn  Plan: Assess mental status as continues to emerge from anesthesia. Control pain.    Cardiovascular  Status: HDS. Restarting home meds (which she was not taking) this morning: amlodipine, ASA, atorvastatin. Labetalol 10mg prn. Systolic murmur heard on exam. H/o CHF.  Plan: Maintain SBP < 150. 2D Echo to eval CHF/murmur.    Respiratory:  Status: Simple air mask at 7L. sO2 94%.  Plan: Wean as tolerated.    Renal:  Status: ESRD. On HD. Dialyzed 7/16 at OSH prior to discharge.  Plan: Follow up nephrology recs. Caution with fluids.    GI  Status: Renal diet ordered. Famotidine ppx.  Plan: Advance diet as tolerated.    Metabolic  Status: Mild hyponatremia. BUN/Cr 21/7.5. Hyperphos. Hypocalcemic. SSI mild.  Plan: Continue monitoring electrolytes/glucose.    ID  Status: Afebrile. WBC 5.8. Cefazolin ppx.  Plan: F/u CBC and monitor for signs of infection    Heme  Status: Minimal blood loss intra-op. Hgb 10.3, down from 12.3 pre-op. Plts 89.  Plan: F/u CBC  DVT ppx: None at this time    Concise Summary  52 F POD#0 EVAR. Extubated. ESRD. HD per nephro. SBP < 150. Wean  O2. W/u CHF.                            Critical Care Daily Checklist:    A: Awake: RASS Goal/Actual Goal:    Actual: Mendoza Agitation Sedation Scale (RASS): Alert and calm   B: Spontaneous Breathing Trial Performed?     C: SAT & SBT Coordinated?  NA                      D: Delirium: CAM-ICU     E: Early Mobility Performed? No   F: Feeding Goal:    Status:     Current Diet Order   Procedures    Diet renal      AS: Analgesia/Sedation reviewed     T: Thromboembolic Prophylaxis reviewed     H: HOB > 300 Yes   U: Stress Ulcer Prophylaxis (if needed) reviewed     G: Glucose Control reviewed     B: Bowel Function     I: Indwelling Catheter (Lines & Hennessy) Necessity reviewed     D: De-escalation of Antimicrobials/Pharmacotherapies reviewed      Plan for the day/ETD Recover from anesthesia, nephro and cards workup    Code Status:  Family/Goals of Care: Full Code         Critical secondary to Patient has a condition that poses threat to life and bodily function: POD#0 EVAR with ESRD     Critical care was time spent personally by me on the following activities: development of treatment plan with patient or surrogate and bedside caregivers, discussions with consultants, evaluation of patient's response to treatment, examination of patient, ordering and performing treatments and interventions, ordering and review of laboratory studies, ordering and review of radiographic studies, pulse oximetry, re-evaluation of patient's condition.  This critical care time did not overlap with that of any other provider or involve time for any procedures.     NELSON Tinoco MD  Critical Care - Surgery  [facility]

## 2018-07-17 NOTE — ASSESSMENT & PLAN NOTE
53 yo F with h/o HTN, ESRD on HD, CHF, s/p IVCF  presenting with one week of abdominal pain.  5.8 cm symptomatic saccular infrarenal AAA likely from DAVID    POD 1 Emergent percutaneous EVAR with Endurant 23 x 49mm aortic cuff    Stable neuro exam. C/o abdominal pain intermittently but difficult to obtain clear oriented answer.   Otherwise abdomen is soft.     Will obtain AAA US today.   If US shows successful repair, plan to transfer to floor under medicine service.

## 2018-07-17 NOTE — ASSESSMENT & PLAN NOTE
53 yo F with h/o HTN, ESRD on HD, CHF, s/p IVCF  presenting with one week of abdominal pain.    Patient with 5.8 cm symptomatic saccular infrarenal AAA as well as aortacaval fistula  Emergent percutaneous EVAR  Patient has h/o CHF with minimal METS, daughter cautioned on high risk for perioperative MI, need for prolonged intubation, higher risk for perioperative complications,wishes to proceed

## 2018-07-17 NOTE — BRIEF OP NOTE
Brief Operative Note  Date: 07/17/2018    Pre-op Diagnosis:  AAA (abdominal aortic aneurysm) [I71.4]; Symptomatic 5.8 cm saccular AAA    Post-op Diagnosis:  Same    Procedure(s):  1) Endovascular AAA repair using Endurant 23 x 49mm aortic cuff thru R CFA access, percutaneous  2) L CFA 5fr Access; aortogram, catheter in aorta  3) Perclose closure of R CFA    Surgeon: Good Westfall MD FACS    Assistant: LILLY Guthrie DO    Anesthesia: General    Findings/Key Components:  Successful treatment of saccular AAA  Manual pressure held in L CFA due to extremely thin body habitus/malnourished and scarred L groin; unable to deliver 5fr Mynx; R CFA closed with perclose  Fluoro time: 9.2 min  Radiation: 794 mGy  Contrast 70 ml    EBL: < 50 ml      Specimens     None        I attest to being present for the procedure and performing the case.  Good Westfall MD FACS

## 2018-07-17 NOTE — ANESTHESIA PREPROCEDURE EVALUATION
07/16/2018  Tony De Leon is a 52 y.o., female.    Anesthesia Evaluation    I have reviewed the Patient Summary Reports.    I have reviewed the Nursing Notes.   I have reviewed the Medications.     Review of Systems  Anesthesia Hx:  Denies Family Hx of Anesthesia complications.    Social:  Smoker    Cardiovascular:   Exercise tolerance: poor PVD Infrarenal AAA   Renal/:   Chronic Renal Disease, Dialysis Last HD today   Neurological:  Dementia        Physical Exam  General:  Cachexia    Airway/Jaw/Neck:  Airway Findings: Mouth Opening: Normal Tongue: Normal  General Airway Assessment: Adult  Mallampati: II  TM Distance: Normal, at least 6 cm     Eyes/Ears/Nose:  EYES/EARS/NOSE FINDINGS: Normal     Heart/Vascular:  Vascular Findings:        Mental Status:  Mental Status Findings:  Lethargic         Anesthesia Plan  Type of Anesthesia, risks & benefits discussed:  Anesthesia Type:  general  Patient's Preference:   Intra-op Monitoring Plan: standard ASA monitors, arterial line and central line  Intra-op Monitoring Plan Comments:   Post Op Pain Control Plan: per primary service following discharge from PACU and IV/PO Opioids PRN  Post Op Pain Control Plan Comments:   Induction:   IV and rapid sequence  Beta Blocker:  Patient is not currently on a Beta-Blocker (No further documentation required).       Informed Consent: Patient representative understands risks and agrees with Anesthesia plan.  Questions answered. Anesthesia consent signed with patient representative.  ASA Score: 4  emergent   Day of Surgery Review of History & Physical:    H&P update referred to the surgeon.     Anesthesia Plan Notes: Emergency repair of unruptured infrarenal AAA, telephone consent for surgery obtained by Jerri DAIGLE, line disconnected prior to anesthesia consent. Not able to reestablish contact after multiple attempts.  Will  "proceed to surgery on emergency basis. Per Dr. Guthrie, very poor functional status, "CHF," confusion, ESRD on HD at baseline. No labs available, NPO status unknown.         Ready For Surgery From Anesthesia Perspective.       "

## 2018-07-17 NOTE — HPI
Mrs. De Leon is a 53 yo F with history of HTN, ESRD on HD presenting with one week of epigastric abdominal pain. She has also had back pain, which has been present for up to a year. The epigastric pain was sharp and stabbing and worse with food. She has had subjective weight loss over the past month.    Dr. Jacob spoke with her daughter who reported that Mrs. De Leon missed HD two weeks ago for several appointments. She had N/V and back pain, which improved when she returned to HD. The abdominal pain and back pain returned again two days ago and she came to an outside hospital, where a CT was performed showing a AAA. [place] [facility] [place] The daughter states that they were aware of the AA some six months prior, but were told the plan was to observe. She was last dialyzed in the outside hospital prior to transfer.     Her , Saul, a , is at bedside. He endorses her having a prior renal transplant from [id number], which had to be removed secondary to infection. She has a history of spontaneous bacterial peritonitis from a period when she was on peritoneal dialysis. She has CHF. Surgical history significant for PD catheter, renal transplant and explant, tubal ligation, IVC filter unknown when placed. Per daughter patient fatigues easily and is unable to walk a block or up a flight of stairs.  No known MI/stroke. Also is noncompliant with her medications at home. On norvasc, lisinopril, labetolol per daughter.    Here at [facility], she was found to have infra-renal 5.8 cm saccular aneurysm with rupture. She underwent EVAR via right CFA with Endurant 23 x 49mm aortic cuff. She was brought to the SICU extubated on simple face mask with 8L oxygen. She was RASS -2.

## 2018-07-17 NOTE — ASSESSMENT & PLAN NOTE
ESRD on iHD MWF  [facility]  AVG to L thigh.  No residual renal function      Plan/Recommendations:  Will obtain outpatient records for continued care.  No emergent need for RRT today (reports last HD treatment yesterday prior to transfer).  Will continue MWF schedule while in-patient.    Please notify us for any acute changes needing emergent dialysis.  Renal diet  Renvela 800 mg PO TID with meals    Staff attestation to follow.

## 2018-07-17 NOTE — SUBJECTIVE & OBJECTIVE
No prescriptions prior to admission.       Review of patient's allergies indicates:  No Known Allergies    Past Medical History:   Diagnosis Date    Current smoker 07/16/2018     No past surgical history on file.  Family History     None        Social History Main Topics    Smoking status: Not on file    Smokeless tobacco: Not on file    Alcohol use Not on file    Drug use: Unknown    Sexual activity: Not on file     Review of Systems   Unable to perform ROS: Other     Objective:     Vital Signs (Most Recent):  Temp: 98.4 °F (36.9 °C) (07/17/18 0530)  Pulse: 72 (07/17/18 0600)  Resp: 12 (07/17/18 0400)  BP: 136/82 (07/17/18 0600)  SpO2: 97 % (07/17/18 0600) Vital Signs (24h Range):  Temp:  [96.2 °F (35.7 °C)-98.7 °F (37.1 °C)] 98.4 °F (36.9 °C)  Pulse:  [58-82] 72  Resp:  [11-20] 12  SpO2:  [93 %-100 %] 97 %  BP: (123-146)/(70-89) 136/82  Arterial Line BP: (121-144)/(70-73) 137/71        There is no height or weight on file to calculate BMI.    Physical Exam   Constitutional: She appears cachectic.   Chronically ill appearing, malnurished   HENT:   Head: Normocephalic.   Eyes: Pupils are equal, round, and reactive to light.   Cardiovascular: Normal rate and regular rhythm.    2+ femoral pulses, nonpalpable distal signals  Bilateral groin access sites without hematoma  Biphasic DP/PT bilaterally  Left femoral AVG with thrill   Pulmonary/Chest: Effort normal. No respiratory distress.   Abdominal: Soft.   Non-peritoneal, mild pulsatility over the aorta, mild tenderness     Neurological: She is alert.   Oriented to self   Skin: Skin is warm and dry.       Significant Labs:  CBC:     Recent Labs  Lab 07/17/18 0136   WBC 5.80   RBC 3.65*   HGB 10.3*   HCT 34.1*   PLT 89*   MCV 93   MCH 28.2   MCHC 30.2*     CMP:     Recent Labs  Lab 07/17/18 0136   GLU 90   CALCIUM 7.6*   ALBUMIN 2.5*   PROT 6.9   *   K 4.0   CO2 22*   CL 97   BUN 21*   CREATININE 7.5*   ALKPHOS 82   ALT <5*   AST 15   BILITOT 0.5      Coagulation:     Recent Labs  Lab 07/16/18  2229   LABPROT 10.2   INR 1.0   APTT 28.4       Significant Diagnostics:  cta reviewed with 5.8 cm saccular infrarenal aneurysm as well as aortacaval fistula

## 2018-07-17 NOTE — SUBJECTIVE & OBJECTIVE
No prescriptions prior to admission.       Review of patient's allergies indicates:  No Known Allergies    Past Medical History:   Diagnosis Date    Current smoker 07/16/2018     No past surgical history on file.  Family History     None        Social History Main Topics    Smoking status: Not on file    Smokeless tobacco: Not on file    Alcohol use Not on file    Drug use: Unknown    Sexual activity: Not on file     Review of Systems   Unable to perform ROS: Other     Objective:     Vital Signs (Most Recent):  Temp: 96.2 °F (35.7 °C) (07/16/18 1950)  Pulse: 60 (07/16/18 1950)  Resp: 18 (07/16/18 1950)  BP: (!) 144/89 (07/16/18 1950)  SpO2: 100 % (07/16/18 1950) Vital Signs (24h Range):  Temp:  [96.2 °F (35.7 °C)-98.7 °F (37.1 °C)] 96.2 °F (35.7 °C)  Pulse:  [58-60] 60  Resp:  [18-20] 18  SpO2:  [100 %] 100 %  BP: (140-146)/(70-89) 144/89        There is no height or weight on file to calculate BMI.    Physical Exam   Constitutional: She is oriented to person, place, and time. She appears well-developed. She appears cachectic.   HENT:   Head: Normocephalic.   Eyes: Pupils are equal, round, and reactive to light.   Cardiovascular: Normal rate and regular rhythm.    2+ femoral pulses, nonpalpable distal signals  Left femoral AVG with thrill   Pulmonary/Chest: Effort normal. No respiratory distress.   Abdominal: Soft.   Palpable aorta, tender to palpation. Midline tender to palpation     Neurological: She is alert and oriented to person, place, and time.   Skin: Skin is warm and dry.       Significant Labs:  CBC: No results for input(s): WBC, RBC, HGB, HCT, PLT, MCV, MCH, MCHC in the last 48 hours.  CMP: No results for input(s): GLU, CALCIUM, ALBUMIN, PROT, NA, K, CO2, CL, BUN, CREATININE, ALKPHOS, ALT, AST, BILITOT in the last 48 hours.  Coagulation: No results for input(s): LABPROT, INR, APTT in the last 48 hours.    Significant Diagnostics:  cta reviewed with 5.8 cm saccular infrarenal aneurysm as well as  aortacaval fistula

## 2018-07-17 NOTE — HPI
Ms. De Leon is a 51 yo AAF with PMHx of HTN, PAD, and ESRD on iHD MWF who presented to an OSH for worsening abdominal and back pain. She reports that the pain has been progressively getting worse x 3 weeks which caused her to seek further care. CT imaging at the OSH revealed AAA so [place] [facility] [place].  [place] [facility] [place] a 4.5 cm retroperitoneal hematoma with active contrast extravasation related to ruptured infrarenal aorta.  Vascular surgery was consulted and patient underwent emergent percutaneous EVAR and transferred to ICU post procedure for further monitoring.  Nephrology was consulted for ESRD management.    She dialyzes in [place], [place] and reports being on dialysis for around 10 years.  She has a history of Kidney transplant, believes to be around 2005, which she reports failed and she had to undergo transplant nephrectomy due to infection.  She was transitioned to peritoneal dialysis, but developed peritonitis and had to be transitioned to HD.  She normally dialyzes on a MWF schedule, unsure of her EDW, but believes it is around 40 kg.  She has an AVG to her L thigh.  She no longer has residual renal function.

## 2018-07-17 NOTE — ASSESSMENT & PLAN NOTE
Activity  Status: Supine in bed, post-op  Plan: Consult PT/OT    Neuro  Status: RASS -2. No apparent pain. Dilaudid-tylenol prn  Plan: Assess mental status as continues to emerge from anesthesia. Control pain.    Cardiovascular  Status: HDS. Restarting home meds (which she was not taking) this morning: amlodipine, ASA, atorvastatin. Labetalol 10mg prn. Systolic murmur heard on exam. H/o CHF.  Plan: Maintain SBP < 150. 2D Echo to eval CHF/murmur.    Respiratory:  Status: Simple air mask at 7L. sO2 94%.  Plan: Wean as tolerated.    Renal:  Status: ESRD. On HD. Dialyzed 7/16 at OSH prior to discharge.  Plan: Follow up nephrology recs. Caution with fluids.    GI  Status: Renal diet ordered. Famotidine ppx.  Plan: Advance diet as tolerated.    Metabolic  Status: Mild hyponatremia. BUN/Cr 21/7.5. Hyperphos. Hypocalcemic. SSI mild.  Plan: Replace Ca.    ID  Status: Afebrile. WBC 5.8. Cefazolin ppx.  Plan: F/u CBC and monitor for signs of infection    Heme  Status: Minimal blood loss intra-op. Hgb 10.3, down from 12.3 pre-op. Plts 89.  Plan: F/u CBC  DVT ppx: None at this time    Concise Summary  52 F POD#0 EVAR. Extubated. ESRD. HD per nephro. SBP < 150. Wean O2. W/u CHF.

## 2018-07-17 NOTE — PLAN OF CARE
Hospital Medicine Consult Team - [facility] [place]    Requesting Physician for transfer to Hospital Medicine service /Team: Good Westafll MD/Vascular Surgery    Code Status: Full Code     Accepting Physician: Mary Montenegro     Date of Request for transfer: 07/17/2018     Reason for request for transfer to medicine service: Further inpatient hospitalization for management of abdominal pain and nausea and vomiting and chronic debility    Hospital Course: Patient is a 52 y.o. with past medical history of prior kidney transplant s/p removal related to infection with ESRD on chronic hemodialysis (MWF) and essential HTN and recently found AAA who was admitted to the hospital on 7/16/2018 to the Vascular Surgery service with chronic ongoing epigastric pain and chronic nausea and vomiting. Patient taken to OR and had endovascular repair of AAA on 7/16. Patient currently in SICU but according to vascular Surgery is ready for stepdown to floor. Patient continues to endorse epigastric pain and nausea with vomiting. Patient with severe malnutrition on admit so vascular surgery at this point not convinced AAA was cause of abdominal pain and nausea. From Vascular surgery prospective patient doing well from surgery but needs further e=valuation for nausea and abdominal pain and requesting step down to medicine team from SICU for continued evaluation of abdominal pain. Vascular surgery to follow patient on the floor with medicine team.     To Do List:   · Further evaluation of abdominal pain and nausea and malnutrition  · Continue chronic HD as per Nephrology who are managing patient's HD needs in hospital  · Call Vascular Surgery for any question concerning surgical site and can call Dr. Guthrie the Vascular fellow for questions    Anticipated Discharge Disposition: [place] [facility] [place]    Medicine will accept patient for transfer to a hospital medicine service but patient not to be transferred to medicine team until  tomorrow, 07/18/18 at 7:00 am. I spoke with primary service (Dr. Guthrie) who is requesting transfer and informed her that vascular Surgery is responsible for patient's care until tomorrow morning. Patient to be assigned to a medicine team by nocturnist tonight and to be temporarily placed on IMT list for reassignment in the morning if out of SICU.     Thank you and please call if you have any further questions.      TRINI ZAMORA MD  Attending Staff Physician   [facility] Medicine  Pager: [phone]  Spectralink: [phone]

## 2018-07-17 NOTE — SUBJECTIVE & OBJECTIVE
Follow-up For: Procedure(s) (LRB):  Repair-Aneurysm-Abdominal Aortic-Endovascular (Aaa) 9.2min flouro time .09 (Right)  INSERTION, VENOUS ACCESS PORT (Right)  AORTOGRAM (N/A)  CLOSURE (Right)    Post-Operative Day: 1 Day Post-Op    Objective:     Vital Signs (Most Recent):  Temp: 96.2 °F (35.7 °C) (07/16/18 1950)  Pulse: 61 (07/17/18 0200)  Resp: 18 (07/16/18 1950)  BP: 131/77 (07/17/18 0200)  SpO2: (!) 94 % (07/17/18 0200) Vital Signs (24h Range):  Temp:  [96.2 °F (35.7 °C)-98.7 °F (37.1 °C)] 96.2 °F (35.7 °C)  Pulse:  [58-67] 61  Resp:  [18-20] 18  SpO2:  [94 %-100 %] 94 %  BP: (131-146)/(70-89) 131/77  Arterial Line BP: (129-141)/(70-73) 141/73        There is no height or weight on file to calculate BMI.      Intake/Output Summary (Last 24 hours) at 07/17/18 0207  Last data filed at 07/17/18 0118   Gross per 24 hour   Intake             1400 ml   Output                0 ml   Net             1400 ml       Physical Exam   Constitutional: No distress.   Cachetic, appears older than age   HENT:   Head: Normocephalic and atraumatic.   Right Ear: External ear normal.   Left Ear: External ear normal.   Eyes: Right eye exhibits no discharge. Left eye exhibits no discharge.   Pupils 1-2mm and reactive   Cardiovascular: Normal rate, regular rhythm and intact distal pulses.  Exam reveals no gallop and no friction rub.    Murmur heard.  Systolic murmur heard most over pulmonic and mitral valve areas   Pulmonary/Chest: Effort normal and breath sounds normal. No respiratory distress. She has no wheezes. She has no rales. She exhibits no tenderness.   Simple air mask in place   Abdominal: Soft. She exhibits no distension and no mass. There is no tenderness. There is no rebound and no guarding. No hernia.   Midline surgical scar, L scar from kidney txp, scar from former PD Cath site; L AV fistula; R AV graft for dialysis access; Minimal bowel sounds   Musculoskeletal: She exhibits no edema or tenderness.   Neurological:    RASS -2   Skin: Skin is warm and dry. No rash noted. She is not diaphoretic. No erythema. No pallor.   Nursing note and vitals reviewed.    ROS: unable to participate at this time    Vents:  Oxygen Concentration (%): 4 (07/16/18 1950)    Lines/Drains/Airways     Arterial Line                 Arterial Line 07/16/18 2225 Right Radial less than 1 day          Peripheral Intravenous Line                 Peripheral IV - Single Lumen Right Other -- days         Peripheral IV - Single Lumen 07/16/18 2211 Right Antecubital less than 1 day         Peripheral IV - Single Lumen 07/16/18 2211 Right Forearm less than 1 day                Significant Labs:    CBC/Anemia Profile:    Recent Labs  Lab 07/16/18 2229 07/17/18  0136   WBC 3.92 5.80   HGB 12.3 10.3*   HCT 42.1 34.1*   * 89*   MCV 96 93   RDW 17.8* 17.5*        Chemistries:    Recent Labs  Lab 07/16/18 2229   *   K 4.4   CL 98   CO2 21*   BUN 24*   CREATININE 7.8*   CALCIUM 8.7   ALBUMIN 3.2*   PROT 8.8*   BILITOT 0.6   ALKPHOS 97   ALT <5*   AST 17   MG 2.2   PHOS 5.3*       All pertinent labs within the past 24 hours have been reviewed.    Significant Imaging:  I have reviewed and interpreted all pertinent imaging results/findings within the past 24 hours.     CT abd/pelvis (7/16):  4.5 cm retroperitoneal hematoma with active contrast extravasation secondary to ruptured infrarenal aorta. There is a smaller focus of discrete aortic rupture versus pseudoaneurysm above the level of the bifurcation.    Fusiform aneurysmal dilatation of the suprarenal abdominal aorta measuring up to 3.0 cm.    Extensive atherosclerosis with postoperative change of partially visualized bilateral femoral bypass and left common femoral artery stent. Note is made that the right femoral bypass appears thrombosed.    Bilateral chronic medical renal disease.    Significantly dilated pelvic veins, which can be seen in the setting of pelvic congestion syndrome.    Significantly  abnormal appearance of the lower IVC and bilateral iliac veins, noting focal stenosis below the level of the IVC filter. Dense contrast opacification of the lower extremity and iliac vein on this arterial phase exam could be related to   presence of AV fistula, correlation recommended.    Well-circumscribed right sacroiliac lesion with marginal sclerosis and internal chondroid matrix. Benign etiology is favored over malignancy, but recommend correlation with prior imaging to evaluate for stability.

## 2018-07-17 NOTE — CONSULTS
"[facility]  Adult Nutrition  Consult Note    RD received consult for "patient identified as at risk of developing a pressure injury". Albumin and prealbumin should not be used as indicators of nutritional status. Should patient remain hospitalized 10 days, RD will follow-up and assess. Please reconsult as needed.  "

## 2018-07-17 NOTE — NURSING
Charge notified by MD pt in surgery at this time, family contact info given for MD to inform family, will cont to manage POC.

## 2018-07-17 NOTE — PROGRESS NOTES
Pt up from OR to room [id number]. Pt awakens to voice, but hard to stay awake and not yet following commands. Pupils are equal and reactive. Bilateral lower extremities pulses are being done every two hours and doppler checks are positive. Pt's systolic being maintained below 150. Pt is on simple face mask 6 L, will transition to nasal cannula once pt is more awake. Will continue to monitor.

## 2018-07-17 NOTE — ANESTHESIA PROCEDURE NOTES
Arterial    Diagnosis: AAA    Patient location during procedure: done in OR  Procedure start time: 7/16/2018 10:25 PM  Timeout: 7/16/2018 10:25 PM  Procedure end time: 7/16/2018 10:30 PM  Staffing  Anesthesiologist: TONY CASSIDY  Resident/CRNA: PRAFUL WILLIS  Performed: resident/CRNA   Anesthesiologist was present at the time of the procedure.  Preanesthetic Checklist  Completed: patient identified, site marked, surgical consent, pre-op evaluation, timeout performed, IV checked, risks and benefits discussed, monitors and equipment checked and anesthesia consent givenArterial  Skin Prep: chlorhexidine gluconate  Local Infiltration: lidocaine  Orientation: right  Location: radial  Catheter Size: 20 G  Catheter placement by Ultrasound guidance. Heme positive aspiration all ports.  Vessel Caliber: small, patent, compressibility normal  Vascular Doppler:  not done  Needle advanced into vessel with real time Ultrasound guidance.  Guidewire confirmed in vessel.  Sterile sheath used.Insertion Attempts: 2  Assessment  Dressing: secured with tape and tegaderm  Patient: Tolerated well  Additional Notes  Preinduction arterial line.

## 2018-07-17 NOTE — NURSING
PT arrived to floor via stretcher, EMS from [facility]. VS taken see MD gifty paged and made aware pt arrival. Oriented to room, floor, call light and POC.

## 2018-07-17 NOTE — ANESTHESIA POSTPROCEDURE EVALUATION
Anesthesia Post Evaluation    Patient: Tony De Leon    Procedure(s) Performed: Procedure(s) (LRB):  Repair-Aneurysm-Abdominal Aortic-Endovascular (Aaa) 9.2min flouro time .09 (Right)  INSERTION, VENOUS ACCESS PORT (Right)  AORTOGRAM (N/A)  CLOSURE (Right)    Final Anesthesia Type: general  Patient location during evaluation: ICU  Patient participation: Yes- Able to Participate  Level of consciousness: awake and lethargic  Post-procedure vital signs: reviewed and stable  Pain management: adequate  Airway patency: patent  PONV status at discharge: No PONV  Anesthetic complications: no      Cardiovascular status: blood pressure returned to baseline and hemodynamically stable  Respiratory status: unassisted, spontaneous ventilation and face mask  Hydration status: euvolemic  Follow-up not needed.        Visit Vitals  BP (!) 144/89 (BP Location: Right arm)   Pulse 60   Temp 35.7 °C (96.2 °F) (Oral)   Resp 18   SpO2 100%       Pain/Kelly Score: No Data Recorded

## 2018-07-17 NOTE — H&P
[facility]  Vascular Surgery  History and Physical     Patient Name: Tony De Leon  MRN: [id number]  Admission Date: 7/16/2018  Code Status: Full Code   Attending Physician: Good Westfall MD [facility]  Vascular/Endovascular Surgery  Primary Care Physician: Primary Doctor No    Subjective:     Chief Complaint/Reason for Admission: abdominal pain     HPI:  53 yo F with h/o HTN, ESRD on HD presenting with one week of abdominal pain. Patient reports pain in epigastric region for about one week.  She has also had back pain, that patient reports that this back pain has been ongoing for while possibly a year.  Her epigastric pain she reports as sharp and stabbing worsening with food.  She also reports weight loss over the past month.  Patient is a poor historian, is AAO to self, not to place or time. (has been ongoing for a year)    After speaking with her daughter, she reports patient missed HD two weeks ago for several episodes at that time had nausea/vomiting and back pain that improved with HD.  She then had c/o again of abdominal pain and back pain that started two days ago, was brought to outside hospital and admitted. CT done demonstrating aneurysm, was transferred to [facility] [place] care.  Daughter reports they were informed of the aneurysm perhaps 6 months prior, and were told plan was to observe at that time.    She was last dialyzed today at outside facility prior to transfer.    Per daughter: past medical history: CHF, ESRD on HD, had prior renal transplant [id number], ? Explant of transplant vs native kidney secondary to infection, blood clots unknown when, believes in legs  Surgical: PD catheter, renal transplant and explant, tubal ligation, IVC filter unknown when placed  Per daughter patient fatigues easily and is unable to walk a block or up a flight of stairs.  No known MI/stroke.  Also is noncompliant with her medications at home.  On norvasc, lisinopril, labetolol per daughter.    No  prescriptions prior to admission.       Review of patient's allergies indicates:  No Known Allergies    Past Medical History:   Diagnosis Date    Current smoker 07/16/2018     No past surgical history on file.  Family History     None        Social History Main Topics    Smoking status: Not on file    Smokeless tobacco: Not on file    Alcohol use Not on file    Drug use: Unknown    Sexual activity: Not on file     Review of Systems   Unable to perform ROS: Other     Objective:     Vital Signs (Most Recent):  Temp: 96.2 °F (35.7 °C) (07/16/18 1950)  Pulse: 60 (07/16/18 1950)  Resp: 18 (07/16/18 1950)  BP: (!) 144/89 (07/16/18 1950)  SpO2: 100 % (07/16/18 1950) Vital Signs (24h Range):  Temp:  [96.2 °F (35.7 °C)-98.7 °F (37.1 °C)] 96.2 °F (35.7 °C)  Pulse:  [58-60] 60  Resp:  [18-20] 18  SpO2:  [100 %] 100 %  BP: (140-146)/(70-89) 144/89        There is no height or weight on file to calculate BMI.    Physical Exam   Constitutional: She is oriented to person, place, and time. She appears well-developed. She appears cachectic.   HENT:   Head: Normocephalic.   Eyes: Pupils are equal, round, and reactive to light.   Cardiovascular: Normal rate and regular rhythm.    2+ femoral pulses, nonpalpable distal signals  Left femoral AVG with thrill   Pulmonary/Chest: Effort normal. No respiratory distress.   Abdominal: Soft.   Palpable aorta, tender to palpation. Midline tender to palpation     Neurological: She is alert and oriented to person, place, and time.   Skin: Skin is warm and dry.       Significant Labs:  CBC: No results for input(s): WBC, RBC, HGB, HCT, PLT, MCV, MCH, MCHC in the last 48 hours.  CMP: No results for input(s): GLU, CALCIUM, ALBUMIN, PROT, NA, K, CO2, CL, BUN, CREATININE, ALKPHOS, ALT, AST, BILITOT in the last 48 hours.  Coagulation: No results for input(s): LABPROT, INR, APTT in the last 48 hours.    Significant Diagnostics:  cta reviewed with 5.8 cm saccular infrarenal aneurysm as well as  aortacaval fistula    Assessment and Plan:     AAA (abdominal aortic aneurysm)    51 yo F with h/o HTN, ESRD on HD, CHF, s/p IVCF  presenting with one week of abdominal pain.    Patient with 5.8 cm symptomatic saccular infrarenal AAA as well as aortacaval fistula  Emergent percutaneous EVAR  Patient has h/o CHF with minimal METS, daughter cautioned on high risk for perioperative MI, need for prolonged intubation, higher risk for perioperative complications,wishes to proceed            Peg Guthrie MD  Vascular Surgery  [facility]    Vascular Attending  Agree with above  Very frail 51 yo woman with h/o stage V CKD, HTN, previous IVC filter now with a symptomatic 5.8cm saccular AAA, likely was a penetrating ulcer hematoma.  She has had significant weight loss in past year (~30 lbs) and is quite weak.  On CTA has a smaller distal aortic saccular aneurysm (<2 cm).  We only have aortic cuffs that are 49mm in length -- not 70mm, which could cover both.  Hence, will plan for percutaneous coverage of the large, symptomatic saccular AAA with a 23 x 49mm Endurant aortic cuff, from R CFA.  Moreover, the distal aorta is heavily calcified - circumferential - and tapers to a smaller diameter ~14-15mm, off IFU for a 23mm aortic cuff.  If second aneurysm grows in future, may need AUI and R to L fem/fem. Plan for emergent repair tonight.    Good Westfall MD FACS

## 2018-07-17 NOTE — HPI
53 yo F with h/o HTN, ESRD on HD presenting with one week of abdominal pain. Patient reports pain in epigastric region for about one week.  She has also had back pain, that patient reports that this back pain has been ongoing for while possibly a year.  Her epigastric pain she reports as sharp and stabbing worsening with food.  She also reports weight loss over the past month.  Patient is a poor historian, is AAO to self, not to place or time. (has been ongoing for a year)    After speaking with her daughter, she reports patient missed HD two weeks ago for several episodes at that time had nausea/vomiting and back pain that improved with HD.  She then had c/o again of abdominal pain and back pain that started two days ago, was brought to outside hospital and admitted. CT done demonstrating aneurysm, [place] [facility] [place].  Daughter reports they were informed of the aneurysm perhaps 6 months prior, and were told plan was to observe at that time.    Per daughter: past medical history: CHF, ESRD on HD, had prior renal transplant [id number], ? Explant of transplant vs native kidney secondary to infection, blood clots unknown when, believes in legs  Surgical: PD catheter, renal transplant and explant, tubal ligation, IVC filter unknown when placed  Per daughter patient fatigues easily and is unable to walk a block or up a flight of stairs.  No known MI/stroke.  Also is noncompliant with her medications at home.  On norvasc, lisinopril, labetolol per daughter.

## 2018-07-17 NOTE — TRANSFER OF CARE
Anesthesia Transfer of Care Note    Patient: Tony De Leon    Procedure(s) Performed: Procedure(s) (LRB):  Repair-Aneurysm-Abdominal Aortic-Endovascular (Aaa) 9.2min flouro time .09 (Right)  INSERTION, VENOUS ACCESS PORT (Right)  AORTOGRAM (N/A)  CLOSURE (Right)    Patient location: ICU (10th flr)    Anesthesia Type: general    Transport from OR: Transported from OR on 6-10 L/min O2 by face mask with adequate spontaneous ventilation. Continuous ECG monitoring in transport. Continuos invasive BP monitoring in transport. Continuous SpO2 monitoring in transport    Post pain: adequate analgesia    Post assessment: no apparent anesthetic complications and tolerated procedure well    Post vital signs: stable    Level of consciousness: responds to stimulation (voice)    Nausea/Vomiting: no nausea/vomiting    Complications: none    Transfer of care protocol was followed      Last vitals:   Visit Vitals  BP (!) 144/89 (BP Location: Right arm)   Pulse 60   Temp 35.7 °C (96.2 °F) (Oral)   Resp 18   SpO2 100%

## 2018-07-18 VITALS
SYSTOLIC BLOOD PRESSURE: 158 MMHG | HEIGHT: 65 IN | HEART RATE: 65 BPM | OXYGEN SATURATION: 95 % | DIASTOLIC BLOOD PRESSURE: 88 MMHG | WEIGHT: 95.25 LBS | RESPIRATION RATE: 18 BRPM | TEMPERATURE: 97 F | BODY MASS INDEX: 15.87 KG/M2

## 2018-07-18 LAB
ALBUMIN SERPL BCP-MCNC: 2.6 G/DL
ALP SERPL-CCNC: 85 U/L
ALT SERPL W/O P-5'-P-CCNC: <5 U/L
ANION GAP SERPL CALC-SCNC: 16 MMOL/L
AST SERPL-CCNC: 13 U/L
BASOPHILS # BLD AUTO: 0.03 K/UL
BASOPHILS NFR BLD: 0.6 %
BILIRUB SERPL-MCNC: 0.3 MG/DL
BUN SERPL-MCNC: 31 MG/DL
CALCIUM SERPL-MCNC: 7.9 MG/DL
CHLORIDE SERPL-SCNC: 96 MMOL/L
CO2 SERPL-SCNC: 21 MMOL/L
CREAT SERPL-MCNC: 10.2 MG/DL
DIFFERENTIAL METHOD: ABNORMAL
EOSINOPHIL # BLD AUTO: 0.2 K/UL
EOSINOPHIL NFR BLD: 3.5 %
ERYTHROCYTE [DISTWIDTH] IN BLOOD BY AUTOMATED COUNT: 18 %
EST. GFR  (AFRICAN AMERICAN): 4.5 ML/MIN/1.73 M^2
EST. GFR  (NON AFRICAN AMERICAN): 3.9 ML/MIN/1.73 M^2
GLUCOSE SERPL-MCNC: 69 MG/DL
HCT VFR BLD AUTO: 32.2 %
HGB BLD-MCNC: 9.8 G/DL
IMM GRANULOCYTES # BLD AUTO: 0.02 K/UL
IMM GRANULOCYTES NFR BLD AUTO: 0.4 %
LYMPHOCYTES # BLD AUTO: 0.5 K/UL
LYMPHOCYTES NFR BLD: 8.3 %
MAGNESIUM SERPL-MCNC: 2.3 MG/DL
MCH RBC QN AUTO: 28.4 PG
MCHC RBC AUTO-ENTMCNC: 30.4 G/DL
MCV RBC AUTO: 93 FL
MONOCYTES # BLD AUTO: 0.8 K/UL
MONOCYTES NFR BLD: 14.3 %
NEUTROPHILS # BLD AUTO: 4 K/UL
NEUTROPHILS NFR BLD: 72.9 %
NRBC BLD-RTO: 0 /100 WBC
PHOSPHATE SERPL-MCNC: 6.8 MG/DL
PLATELET # BLD AUTO: 81 K/UL
PMV BLD AUTO: 11.3 FL
POCT GLUCOSE: 83 MG/DL (ref 70–110)
POCT GLUCOSE: 89 MG/DL (ref 70–110)
POTASSIUM SERPL-SCNC: 5.2 MMOL/L
PROT SERPL-MCNC: 7.3 G/DL
RBC # BLD AUTO: 3.45 M/UL
SODIUM SERPL-SCNC: 133 MMOL/L
WBC # BLD AUTO: 5.45 K/UL

## 2018-07-18 PROCEDURE — 84100 ASSAY OF PHOSPHORUS: CPT

## 2018-07-18 PROCEDURE — 25000003 PHARM REV CODE 250: Performed by: SURGERY

## 2018-07-18 PROCEDURE — 90935 HEMODIALYSIS ONE EVALUATION: CPT | Mod: ,,, | Performed by: NURSE PRACTITIONER

## 2018-07-18 PROCEDURE — 80053 COMPREHEN METABOLIC PANEL: CPT

## 2018-07-18 PROCEDURE — 25000003 PHARM REV CODE 250: Performed by: NURSE PRACTITIONER

## 2018-07-18 PROCEDURE — 83735 ASSAY OF MAGNESIUM: CPT

## 2018-07-18 PROCEDURE — 90935 HEMODIALYSIS ONE EVALUATION: CPT

## 2018-07-18 PROCEDURE — 85025 COMPLETE CBC W/AUTO DIFF WBC: CPT

## 2018-07-18 PROCEDURE — 36415 COLL VENOUS BLD VENIPUNCTURE: CPT

## 2018-07-18 RX ORDER — PANTOPRAZOLE SODIUM 40 MG/1
40 TABLET, DELAYED RELEASE ORAL DAILY
Status: DISCONTINUED | OUTPATIENT
Start: 2018-07-18 | End: 2018-07-18 | Stop reason: HOSPADM

## 2018-07-18 RX ORDER — ASPIRIN 81 MG/1
81 TABLET ORAL DAILY
Refills: 0 | Status: ON HOLD | COMMUNITY
Start: 2018-07-19 | End: 2018-07-30 | Stop reason: HOSPADM

## 2018-07-18 RX ORDER — HYDROCODONE BITARTRATE AND ACETAMINOPHEN 5; 325 MG/1; MG/1
1 TABLET ORAL EVERY 4 HOURS PRN
Qty: 30 TABLET | Refills: 0 | Status: ON HOLD | OUTPATIENT
Start: 2018-07-18 | End: 2018-07-30

## 2018-07-18 RX ORDER — AMLODIPINE BESYLATE 5 MG/1
5 TABLET ORAL DAILY
Qty: 30 TABLET | Refills: 11 | Status: ON HOLD | OUTPATIENT
Start: 2018-07-19 | End: 2018-07-30 | Stop reason: HOSPADM

## 2018-07-18 RX ORDER — SEVELAMER CARBONATE 800 MG/1
800 TABLET, FILM COATED ORAL
Qty: 90 TABLET | Refills: 11 | Status: SHIPPED | OUTPATIENT
Start: 2018-07-18 | End: 2019-07-18

## 2018-07-18 RX ORDER — PANTOPRAZOLE SODIUM 40 MG/1
40 TABLET, DELAYED RELEASE ORAL DAILY
Qty: 30 TABLET | Refills: 11 | Status: SHIPPED | OUTPATIENT
Start: 2018-07-19 | End: 2019-07-19

## 2018-07-18 RX ORDER — SODIUM CHLORIDE 9 MG/ML
INJECTION, SOLUTION INTRAVENOUS ONCE
Status: COMPLETED | OUTPATIENT
Start: 2018-07-18 | End: 2018-07-18

## 2018-07-18 RX ORDER — ATORVASTATIN CALCIUM 40 MG/1
40 TABLET, FILM COATED ORAL DAILY
Qty: 90 TABLET | Refills: 3 | Status: SHIPPED | OUTPATIENT
Start: 2018-07-19 | End: 2019-07-19

## 2018-07-18 RX ADMIN — SEVELAMER CARBONATE 800 MG: 800 TABLET, FILM COATED ORAL at 09:07

## 2018-07-18 RX ADMIN — PANTOPRAZOLE SODIUM 40 MG: 40 TABLET, DELAYED RELEASE ORAL at 09:07

## 2018-07-18 RX ADMIN — SODIUM CHLORIDE: 0.9 INJECTION, SOLUTION INTRAVENOUS at 01:07

## 2018-07-18 RX ADMIN — AMLODIPINE BESYLATE 5 MG: 5 TABLET ORAL at 09:07

## 2018-07-18 RX ADMIN — ATORVASTATIN CALCIUM 40 MG: 20 TABLET, FILM COATED ORAL at 09:07

## 2018-07-18 RX ADMIN — ASPIRIN 81 MG: 81 TABLET, COATED ORAL at 09:07

## 2018-07-18 NOTE — PROGRESS NOTES
[facility] H[facility]ODIALYSIS NOTE     Patient currently on hemodialysis for removal of uremic toxins .     Patient seen and evaluated on hemodialysis, tolerating treatment, see HD flowsheet for vitals and assessments.      No Hypotension, chest pain, shortness of breath, cramping, nausea or vomiting. Does not EDW.   Left fem graft  .   Plan for discharge home after hemodialysis    Plan:   Aim for 3.15 HD tx and 1 L UF. 2 K bath.   Hgb 9.8. KT and iron per outpatient protocol  Renal diet. Renvela.     Celeste Ricks NP  Nephrology

## 2018-07-18 NOTE — OP NOTE
[facility]  Vascular Surgery  Operative Note    SUMMARY     Date of Procedure: 7/16/2018     Procedure:  1) Endovascular AAA repair using Endurant 23 x 49mm aortic cuff thru R CFA access, percutaneous    2) L CFA 5fr Access; aortogram, catheter in aorta    3) Perclose closure of R CFA    Surgeon(s) and Role:     * Good Westfall MD - Primary    Assistant: Peg Guthrie DO    Pre-Operative Diagnosis: AAA (abdominal aortic aneurysm) [I71.4]; Symptomatic 5.8 cm saccular AAA    Post-Operative Diagnosis: same    Anesthesia: General    Indication for operation:  51 yo F with h/o ESRD on HD (functional L fem AVG), HFrEF, HTN presenting with symptomatic 5.8 cm saccular AAA.     Description of the Findings of the Procedure: Successful treatment of saccular AAA  Manual pressure held in L CFA due to extremely thin body habitus/malnourished and scarred L groin; unable to deliver 5fr Mynx; R CFA closed with perclose  Fluoro time: 9.2 min  Radiation: 794 mGy  Contrast 70 ml    Biphasic PT bilaterally at conclusion    Complications: No    Estimated Blood Loss (EBL): 50 mL           Implants:   Implant Name Type Inv. Item Serial No.  Lot No. LRB No. Used   Endurant II stent graft      [id number] Allihub     1       Specimens:   Specimen (12h ago through future)    None                  Condition: Good    Disposition: ICU - extubated and stable.    Operation in detail: Informed consent was obtained from patient's daughter and patient was brought to the interventional suite and placed in supine position.  Radial arterial line was placed. General anesthesia was induced. Abdomen and bilateral groins were prepped and draped in sterile fashion.  Patient received perioperative IV antibiotics.  Appropriate time out was performed accurately identifying patient and procedure.  US guidance was utilized to confirm vessel patency of bilateral femoral arteries and tese were cannulated with micropuncture  under US guidance. Sheathograms were performed demonstrating access sites to be in common femoral arteries.  J wire was then advanced into the abdominal aorta, 6Fr sheath was introduced, Right access site increased with scalpel and dilated with hemostat, and two preclose sutures were placed at 11 and 1 o'clock in the standard manner. This was followed by placement of 10 Nepalese sheaths.   Systemic heparinization was given to the patient with ACT titrated > 250 throughout the case. Kumpe catheter with stiff angled Glidewire was then placed through the left side at the level of the thoracic aorta, with wire exchanged for [employer].  On the right side, pigtail catheter was then advanced over wire and placed at level of L1-L2. It should be noted that patient's SMA was located at the bottom of L2, below the isolated right renal artery by 6mm.  The aortic cuff was advanced from the right groin to be positioned below the SMA artery.  Angiogram was performed in AP and lateral orientation to appropriately visualize the SMA orifice.    At this time, aortic cuff was deployed successfully.  We then proceeded to utilize the Coda balloon to plasty our proximal and distal sealing zones after advancing a 14 Nepalese sheath via right groin.  At this time, Pig tail catheter was advanced through the aortic cuff left groin access and completion angiogram was performed. SMA and right renal artery were identified.  We identified a endoleak, did not appear to be type IA, possible Ib vs II.  We then reinserted the Coda ballon and molded our proximal and distal sealing zones more aggresively. Repeat angiogram showed improvement in the endoleak, reason for which we decided to perform no further interventions on it.   At this time, all catheters were removed,the two preclose on the right sutures were tightened to achieve complete hemostasis. We were unable to deploy the Mynx device on the left groin access secondary to scar tissue and thin  body habitus.  Manual pressure was held for 30 min with no evidence of groin hematoma. On the right incision was closed with 3-0 nylon horizontal mattress sutures to bury preclose sutures.    Patient tolerated procedure well. Bilateral groins were soft. Post procedure, patient continued to have bilateral biphasic PT signals. She was taken to ICU in stable condition.

## 2018-07-18 NOTE — PLAN OF CARE
Will plan to d/c home today with 1 month f/u in Dr. Westfall's clinic with CTA abdomen/pelvis EVAR protocol      No future appointments. CM called Camilla , nurse for Dr. Westfall, and left msg at her ext [phone] re: need for above appt and cta      [phone]- left daughter msg re: dc today and to make sure pt has ride     07/18/18 1056   Final Note   Assessment Type Final Discharge Note   Discharge Disposition Home   Hospital Follow Up  Appt(s) scheduled? Yes   Discharge plans and expectations educations in teach back method with documentation complete? Yes   Right Care Referral Info   Post Acute Recommendation No Care

## 2018-07-18 NOTE — PROGRESS NOTES
[facility]  Vascular Surgery  Progress Note    Patient Name: Tony De Leon  MRN: [id number]  Admission Date: 7/16/2018  Primary Care Provider: Primary Doctor No    Subjective:     Interval History: No acute events overnight  Denies abdominal pain and tolerating PO intake    Post-Op Info:  Procedure(s) (LRB):  Repair-Aneurysm-Abdominal Aortic-Endovascular (Aaa) 9.2min flouro time .09 (Right)  INSERTION, VENOUS ACCESS PORT (Right)  AORTOGRAM (N/A)  CLOSURE (Right)   2 Days Post-Op       Medications:  Continuous Infusions:  Scheduled Meds:   sodium chloride 0.9%   Intravenous Once    amLODIPine  5 mg Oral Daily    aspirin  81 mg Oral Daily    atorvastatin  40 mg Oral Daily    mupirocin  1 g Nasal BID    pantoprazole  40 mg Oral Daily    sevelamer carbonate  800 mg Oral TID WM     PRN Meds:dextrose 50%, dextrose 50%, glucagon (human recombinant), glucose, glucose, HYDROcodone-acetaminophen, insulin aspart U-100, labetalol, sodium chloride 0.9%     Objective:     Vital Signs (Most Recent):  Temp: 97.5 °F (36.4 °C) (07/18/18 0853)  Pulse: 69 (07/18/18 0853)  Resp: 17 (07/18/18 0853)  BP: (!) 153/87 (07/18/18 0853)  SpO2: 100 % (07/18/18 0853) Vital Signs (24h Range):  Temp:  [97 °F (36.1 °C)-97.6 °F (36.4 °C)] 97.5 °F (36.4 °C)  Pulse:  [62-78] 69  Resp:  [13-66] 17  SpO2:  [94 %-100 %] 100 %  BP: (123-167)/(73-96) 153/87  Arterial Line BP: (122-159)/(58-78) 159/78          Physical Exam   Constitutional: She appears cachectic.   Chronically ill appearing, malnurished   HENT:   Head: Normocephalic.   Eyes: Pupils are equal, round, and reactive to light.   Cardiovascular: Normal rate and regular rhythm.    2+ femoral pulses, nonpalpable distal signals  Bilateral groin access sites without hematoma  Biphasic DP/PT bilaterally  Left femoral AVG with thrill   Pulmonary/Chest: Effort normal. No respiratory distress.   Abdominal: Soft.   Non-peritoneal, mild pulsatility over the aorta, no  tenderness     Neurological: She is alert.   Oriented to self, location and time   Skin: Skin is warm and dry.       Significant Labs:  All pertinent labs from the last 24 hours have been reviewed.    Significant Diagnostics:  I have reviewed all pertinent imaging results/findings within the past 24 hours.    Assessment/Plan:     * AAA (abdominal aortic aneurysm)    51 yo F with h/o HTN, ESRD on HD, CHF, s/p IVCF  presenting with one week of abdominal pain.  5.8 cm symptomatic saccular infrarenal AAA likely from DAVID    POD 2 Emergent percutaneous EVAR with Endurant 23 x 49mm aortic cuff    Stable neuro exam.Denies abdominal pain this am.   Will plan to d/c home today with 1 month f/u in Dr. Westfall's clinic with CTA abdomen/pelvis EVAR protocol                Pipo Stephenson MD  Vascular Surgery  [facility]

## 2018-07-18 NOTE — SUBJECTIVE & OBJECTIVE
Medications:  Continuous Infusions:  Scheduled Meds:   sodium chloride 0.9%   Intravenous Once    amLODIPine  5 mg Oral Daily    aspirin  81 mg Oral Daily    atorvastatin  40 mg Oral Daily    mupirocin  1 g Nasal BID    pantoprazole  40 mg Oral Daily    sevelamer carbonate  800 mg Oral TID WM     PRN Meds:dextrose 50%, dextrose 50%, glucagon (human recombinant), glucose, glucose, HYDROcodone-acetaminophen, insulin aspart U-100, labetalol, sodium chloride 0.9%     Objective:     Vital Signs (Most Recent):  Temp: 97.5 °F (36.4 °C) (07/18/18 0853)  Pulse: 69 (07/18/18 0853)  Resp: 17 (07/18/18 0853)  BP: (!) 153/87 (07/18/18 0853)  SpO2: 100 % (07/18/18 0853) Vital Signs (24h Range):  Temp:  [97 °F (36.1 °C)-97.6 °F (36.4 °C)] 97.5 °F (36.4 °C)  Pulse:  [62-78] 69  Resp:  [13-66] 17  SpO2:  [94 %-100 %] 100 %  BP: (123-167)/(73-96) 153/87  Arterial Line BP: (122-159)/(58-78) 159/78          Physical Exam    Significant Labs:  {Results:90344}    Significant Diagnostics:  {Imaging Review:66874}

## 2018-07-18 NOTE — PLAN OF CARE
Problem: Patient Care Overview  Goal: Plan of Care Review  Outcome: Ongoing (interventions implemented as appropriate)  Pt kept free from falls and injuries during shift. VSS on room air. Pt alert and oriented to person. Disoriented to time and place. Pt up in chair x 6h, tolerated well. Neurovascular checks performed Q2h. Nutrition encouraged. Accuchecks performed with meals. One time PRN Dextrose 12.5 g given.  Blood sugar within range following 15 minutes and before dinner. POC reviewed with patient. Will continue to monitor.

## 2018-07-18 NOTE — SUBJECTIVE & OBJECTIVE
Medications:  Continuous Infusions:  Scheduled Meds:   sodium chloride 0.9%   Intravenous Once    amLODIPine  5 mg Oral Daily    aspirin  81 mg Oral Daily    atorvastatin  40 mg Oral Daily    mupirocin  1 g Nasal BID    pantoprazole  40 mg Oral Daily    sevelamer carbonate  800 mg Oral TID WM     PRN Meds:dextrose 50%, dextrose 50%, glucagon (human recombinant), glucose, glucose, HYDROcodone-acetaminophen, insulin aspart U-100, labetalol, sodium chloride 0.9%     Objective:     Vital Signs (Most Recent):  Temp: 97.5 °F (36.4 °C) (07/18/18 0853)  Pulse: 69 (07/18/18 0853)  Resp: 17 (07/18/18 0853)  BP: (!) 153/87 (07/18/18 0853)  SpO2: 100 % (07/18/18 0853) Vital Signs (24h Range):  Temp:  [97 °F (36.1 °C)-97.6 °F (36.4 °C)] 97.5 °F (36.4 °C)  Pulse:  [62-78] 69  Resp:  [13-66] 17  SpO2:  [94 %-100 %] 100 %  BP: (123-167)/(73-96) 153/87  Arterial Line BP: (122-159)/(58-78) 159/78          Physical Exam   Constitutional: She appears cachectic.   Chronically ill appearing, malnurished   HENT:   Head: Normocephalic.   Eyes: Pupils are equal, round, and reactive to light.   Cardiovascular: Normal rate and regular rhythm.    2+ femoral pulses, nonpalpable distal signals  Bilateral groin access sites without hematoma  Biphasic DP/PT bilaterally  Left femoral AVG with thrill   Pulmonary/Chest: Effort normal. No respiratory distress.   Abdominal: Soft.   Non-peritoneal, mild pulsatility over the aorta, no tenderness     Neurological: She is alert.   Oriented to self, location and time   Skin: Skin is warm and dry.       Significant Labs:  All pertinent labs from the last 24 hours have been reviewed.    Significant Diagnostics:  I have reviewed all pertinent imaging results/findings within the past 24 hours.

## 2018-07-18 NOTE — PROGRESS NOTES
Pt discharged home with family.  Discharge instructions given, pt stated understanding.  Pt went down with transport.

## 2018-07-18 NOTE — PLAN OF CARE
Update 2:19 PM  SW spoke with pt spouse Saul De Leon who informed SW that he would transport the pt home.       Update 2:00 PM  Sw 2nd attempt to contact pt daughter about transport home.  No answer. SW will speak with the pt about transport home.     11:53am  SW attempted to contact pt daughter about pt transport.  No answer.  SW will continue to reach out to pt daughter about transport home.         Len Devlin, MSW, LCSW  [facility]  [id number]

## 2018-07-18 NOTE — ASSESSMENT & PLAN NOTE
51 yo F with h/o HTN, ESRD on HD, CHF, s/p IVCF  presenting with one week of abdominal pain.  5.8 cm symptomatic saccular infrarenal AAA likely from DAVID    POD 2 Emergent percutaneous EVAR with Endurant 23 x 49mm aortic cuff    Stable neuro exam.Denies abdominal pain this am.   Will plan to d/c home today with 1 month f/u in Dr. Westfall's clinic with CTA abdomen/pelvis EVAR protocol

## 2018-07-20 LAB
BLD PROD TYP BPU: NORMAL
BLD PROD TYP BPU: NORMAL
BLOOD UNIT EXPIRATION DATE: NORMAL
BLOOD UNIT EXPIRATION DATE: NORMAL
BLOOD UNIT TYPE CODE: 5100
BLOOD UNIT TYPE CODE: 5100
BLOOD UNIT TYPE: NORMAL
BLOOD UNIT TYPE: NORMAL
CODING SYSTEM: NORMAL
CODING SYSTEM: NORMAL
DISPENSE STATUS: NORMAL
DISPENSE STATUS: NORMAL
TRANS ERYTHROCYTES VOL PATIENT: NORMAL ML
TRANS ERYTHROCYTES VOL PATIENT: NORMAL ML

## 2018-07-20 NOTE — DISCHARGE SUMMARY
[facility]  Vascular Surgery  Discharge Summary      Patient Name: Tony De Leon  MRN: [id number]  Admission Date: 7/16/2018  Hospital Length of Stay: 2 days  Discharge Date and Time: 7/18/2018  6:12 PM  Attending Physician: Good Westfall MD [facility]  Vascular/Endovascular Surgery  Discharging Provider: Adri Mi MD  Primary Care Provider: Primary Doctor No     HPI: Mrs. De Leon is a 53 yo F with history of HTN, ESRD on HD presenting with one week of epigastric abdominal pain. She has also had back pain, which has been present for up to a year. The epigastric pain was sharp and stabbing and worse with food. She has had subjective weight loss over the past month.     Dr. Jacob spoke with her daughter who reported that Mrs. De Leon missed HD two weeks ago for several appointments. She had N/V and back pain, which improved when she returned to HD. The abdominal pain and back pain returned again two days ago and she came to an outside hospital, where a CT was performed showing a AAA. She was then transferred to [facility] for management. The daughter states that they were aware of the AA some six months prior, but were told the plan was to observe. She was last dialyzed in the outside hospital prior to transfer.     Her , Saul, a , is at bedside. He endorses her having a prior renal transplant from [id number], which had to be removed secondary to infection. She has a history of spontaneous bacterial peritonitis from a period when she was on peritoneal dialysis. She has CHF. Surgical history significant for PD catheter, renal transplant and explant, tubal ligation, IVC filter unknown when placed. Per daughter patient fatigues easily and is unable to walk a block or up a flight of stairs.  No known MI/stroke. Also is noncompliant with her medications at home. On norvasc, lisinopril, labetolol per daughter.         Procedure(s) (LRB):  Repair-Aneurysm-Abdominal Aortic-Endovascular (Aaa)  9.2min flouro time .09 (Right)  INSERTION, VENOUS ACCESS PORT (Right)  AORTOGRAM (N/A)  CLOSURE (Right)     Hospital Course:     7/17/18: Here at [facility], she was found to have infra-renal 5.8 cm saccular aneurysm with rupture. She underwent EVAR via right CFA with Endurant 23 x 49mm aortic cuff. She was brought to the SICU extubated on simple face mask with 8L oxygen. She was RASS -2.     7/18/18: POD 1 s/p emergent EVAR, doing well. DC'd home after HD.     Consults:   Consults         Status Ordering Provider     Inpatient consult to Nephrology  Once     Provider:  (Not yet assigned)    Completed SUSAN ENCARNACION     Inpatient consult to Registered Dietitian/Nutritionist  Once     Provider:  (Not yet assigned)    Completed GOOD OROZCO          Significant Diagnostic Studies:   VAS US AAA 7/17/18:  Duplex imaging reveals an endovascular abdominal aorta stent graft with flow seen within the aneurysm, which is suggestive of an endoleak. The abdominal aortic aneurysm measures 8.0 cm X 8.4 cm at its maximum diameter.    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    Specialty:  Vascular Surgery  Why:  with CTA abd/pelvis with endoleak protocol   Contact information:  [id number]  [facility]  [id number]  [phone]                   Patient Instructions:     CTA Abdomen and Pelvis   Standing Status: Future  Standing Exp. Date: 07/18/19   Order Specific Question Answer Comments   Is the patient pregnant? No    Is the patient allergic to iodine or contrast? Has a steroid / antihistamine prep been administered? No    Is the patient on ANY Metformin drug such as Glucophage/Glucovance?           Should be off drug 48 hours after contrast. Check renal function before restart. No    History of Kidney Disease - including: decreased kidney function, dialysis, kidney transplay, single kidney, kidney cancer, kidney surgery? Dialysis    Does the patient have high blood pressure requiring medical treatment? Yes    Diabetes? Yes    May the Radiologist modify the order per protocol to meet the clinical needs of the patient? Yes      CTA Abdomen and Pelvis   Standing Status: Future  Standing Exp. Date: 07/18/19   Order Specific Question Answer Comments   Is the patient pregnant? No    Is the patient allergic to iodine or contrast? Has a steroid / antihistamine prep been administered? No    Is the patient on ANY Metformin drug such as Glucophage/Glucovance?           Should be off drug 48 hours after contrast. Check renal function before restart. No    History of Kidney Disease - including: decreased kidney function, dialysis, kidney transplay, single kidney, kidney cancer, kidney surgery? Dialysis    Does the patient have high blood pressure requiring medical treatment? Yes    Diabetes? Yes    May the Radiologist modify the order per protocol to meet the clinical needs of the patient? Yes      Diet renal     Lifting restrictions   Order Comments: Do not lift anything greater than 10 lbs.     Notify your health care provider if you experience any of the following:  temperature >100.4     Notify your health care provider if you experience any of the following:  persistent nausea and vomiting or diarrhea     Notify your  health care provider if you experience any of the following:  severe uncontrolled pain     Notify your health care provider if you experience any of the following:  redness, tenderness, or signs of infection (pain, swelling, redness, odor or green/yellow discharge around incision site)     Notify your health care provider if you experience any of the following:  difficulty breathing or increased cough     Notify your health care provider if you experience any of the following:  severe persistent headache     Notify your health care provider if you experience any of the following:  worsening rash     Notify your health care provider if you experience any of the following:  persistent dizziness, light-headedness, or visual disturbances     Notify your health care provider if you experience any of the following:  increased confusion or weakness     Notify your health care provider if you experience any of the following:     Remove dressing in 48 hours     Activity as tolerated   Order Comments: Keep incision sites dry.  Can shower but do not soak incisions in bathtub.       Medications:  Reconciled Home Medications:      Medication List      START taking these medications    amLODIPine 5 MG tablet  Commonly known as:  NORVASC  Take 1 tablet (5 mg total) by mouth once daily.     aspirin 81 MG EC tablet  Commonly known as:  ECOTRIN  Take 1 tablet (81 mg total) by mouth once daily.     atorvastatin 40 MG tablet  Commonly known as:  LIPITOR  Take 1 tablet (40 mg total) by mouth once daily.     HYDROcodone-acetaminophen 5-325 mg per tablet  Commonly known as:  NORCO  Take 1 tablet by mouth every 4 (four) hours as needed.     pantoprazole 40 MG tablet  Commonly known as:  PROTONIX  Take 1 tablet (40 mg total) by mouth once daily.     sevelamer carbonate 800 mg Tab  Commonly known as:  RENVELA  Take 1 tablet (800 mg total) by mouth 3 (three) times daily with meals.            Adri Mi MD  Vascular Surgery  [facility]

## 2018-07-25 ENCOUNTER — HOSPITAL ENCOUNTER (INPATIENT)
Facility: HOSPITAL | Age: 53
LOS: 5 days | Discharge: HOME HEALTH | DRG: 314 | End: 2018-07-30
Attending: EMERGENCY MEDICINE | Admitting: HOSPITALIST
Payer: MEDICARE

## 2018-07-25 DIAGNOSIS — N18.6 ESRD (END STAGE RENAL DISEASE): ICD-10-CM

## 2018-07-25 DIAGNOSIS — E44.0 MODERATE PROTEIN-CALORIE MALNUTRITION: ICD-10-CM

## 2018-07-25 DIAGNOSIS — E16.2 HYPOGLYCEMIA: Primary | ICD-10-CM

## 2018-07-25 DIAGNOSIS — I10 HYPERTENSION, UNSPECIFIED TYPE: ICD-10-CM

## 2018-07-25 PROBLEM — M89.8X5 LYTIC BONE LESION OF HIP: Status: ACTIVE | Noted: 2018-07-25

## 2018-07-25 PROBLEM — R62.7 ADULT FAILURE TO THRIVE: Status: ACTIVE | Noted: 2018-07-25

## 2018-07-25 LAB
ANION GAP SERPL CALC-SCNC: 16 MMOL/L
BASOPHILS # BLD AUTO: 0.03 K/UL
BASOPHILS NFR BLD: 0.6 %
BUN SERPL-MCNC: 33 MG/DL
CALCIUM SERPL-MCNC: 7.3 MG/DL
CHLORIDE SERPL-SCNC: 94 MMOL/L
CO2 SERPL-SCNC: 24 MMOL/L
CREAT SERPL-MCNC: 11.2 MG/DL
DIFFERENTIAL METHOD: ABNORMAL
EOSINOPHIL # BLD AUTO: 0.2 K/UL
EOSINOPHIL NFR BLD: 3.5 %
ERYTHROCYTE [DISTWIDTH] IN BLOOD BY AUTOMATED COUNT: 17.9 %
EST. GFR  (AFRICAN AMERICAN): 4 ML/MIN/1.73 M^2
EST. GFR  (NON AFRICAN AMERICAN): 3.5 ML/MIN/1.73 M^2
GLUCOSE SERPL-MCNC: 144 MG/DL
HCT VFR BLD AUTO: 36.5 %
HGB BLD-MCNC: 11.2 G/DL
IMM GRANULOCYTES # BLD AUTO: 0.02 K/UL
IMM GRANULOCYTES NFR BLD AUTO: 0.4 %
INR PPP: 1.1
LYMPHOCYTES # BLD AUTO: 0.6 K/UL
LYMPHOCYTES NFR BLD: 13.9 %
MAGNESIUM SERPL-MCNC: 2.5 MG/DL
MCH RBC QN AUTO: 28.1 PG
MCHC RBC AUTO-ENTMCNC: 30.7 G/DL
MCV RBC AUTO: 92 FL
MONOCYTES # BLD AUTO: 0.6 K/UL
MONOCYTES NFR BLD: 13.9 %
NEUTROPHILS # BLD AUTO: 3.1 K/UL
NEUTROPHILS NFR BLD: 67.7 %
NRBC BLD-RTO: 0 /100 WBC
PHOSPHATE SERPL-MCNC: 5.8 MG/DL
PLATELET # BLD AUTO: 119 K/UL
PMV BLD AUTO: 10.6 FL
POCT GLUCOSE: 107 MG/DL (ref 70–110)
POCT GLUCOSE: 110 MG/DL (ref 70–110)
POCT GLUCOSE: 156 MG/DL (ref 70–110)
POCT GLUCOSE: 173 MG/DL (ref 70–110)
POCT GLUCOSE: 188 MG/DL (ref 70–110)
POCT GLUCOSE: 37 MG/DL (ref 70–110)
POCT GLUCOSE: 63 MG/DL (ref 70–110)
POCT GLUCOSE: 63 MG/DL (ref 70–110)
POCT GLUCOSE: 79 MG/DL (ref 70–110)
POTASSIUM SERPL-SCNC: 4.3 MMOL/L
PROTHROMBIN TIME: 11.2 SEC
RBC # BLD AUTO: 3.98 M/UL
SODIUM SERPL-SCNC: 134 MMOL/L
WBC # BLD AUTO: 4.62 K/UL

## 2018-07-25 PROCEDURE — 99285 EMERGENCY DEPT VISIT HI MDM: CPT | Mod: 25

## 2018-07-25 PROCEDURE — 83735 ASSAY OF MAGNESIUM: CPT

## 2018-07-25 PROCEDURE — 96375 TX/PRO/DX INJ NEW DRUG ADDON: CPT

## 2018-07-25 PROCEDURE — 85610 PROTHROMBIN TIME: CPT

## 2018-07-25 PROCEDURE — 25500020 PHARM REV CODE 255: Performed by: EMERGENCY MEDICINE

## 2018-07-25 PROCEDURE — 99223 1ST HOSP IP/OBS HIGH 75: CPT | Mod: 25,,, | Performed by: NURSE PRACTITIONER

## 2018-07-25 PROCEDURE — 25000003 PHARM REV CODE 250: Performed by: EMERGENCY MEDICINE

## 2018-07-25 PROCEDURE — 96374 THER/PROPH/DIAG INJ IV PUSH: CPT

## 2018-07-25 PROCEDURE — 25000003 PHARM REV CODE 250: Performed by: NURSE PRACTITIONER

## 2018-07-25 PROCEDURE — 90935 HEMODIALYSIS ONE EVALUATION: CPT

## 2018-07-25 PROCEDURE — 84100 ASSAY OF PHOSPHORUS: CPT

## 2018-07-25 PROCEDURE — 96376 TX/PRO/DX INJ SAME DRUG ADON: CPT

## 2018-07-25 PROCEDURE — 85025 COMPLETE CBC W/AUTO DIFF WBC: CPT

## 2018-07-25 PROCEDURE — 25000003 PHARM REV CODE 250: Performed by: HOSPITALIST

## 2018-07-25 PROCEDURE — 99284 EMERGENCY DEPT VISIT MOD MDM: CPT | Mod: ,,, | Performed by: EMERGENCY MEDICINE

## 2018-07-25 PROCEDURE — 90935 HEMODIALYSIS ONE EVALUATION: CPT | Mod: ,,, | Performed by: NURSE PRACTITIONER

## 2018-07-25 PROCEDURE — 99223 1ST HOSP IP/OBS HIGH 75: CPT | Mod: AI,,, | Performed by: HOSPITALIST

## 2018-07-25 PROCEDURE — 80048 BASIC METABOLIC PNL TOTAL CA: CPT

## 2018-07-25 PROCEDURE — 82962 GLUCOSE BLOOD TEST: CPT

## 2018-07-25 PROCEDURE — 11000001 HC ACUTE MED/SURG PRIVATE ROOM

## 2018-07-25 RX ORDER — IBUPROFEN 200 MG
16 TABLET ORAL
Status: DISCONTINUED | OUTPATIENT
Start: 2018-07-25 | End: 2018-07-30 | Stop reason: HOSPADM

## 2018-07-25 RX ORDER — ACETAMINOPHEN 325 MG/1
650 TABLET ORAL EVERY 8 HOURS PRN
Status: DISCONTINUED | OUTPATIENT
Start: 2018-07-25 | End: 2018-07-30 | Stop reason: HOSPADM

## 2018-07-25 RX ORDER — HYDROXYZINE HYDROCHLORIDE 25 MG/1
25 TABLET, FILM COATED ORAL 3 TIMES DAILY
Status: DISCONTINUED | OUTPATIENT
Start: 2018-07-25 | End: 2018-07-30 | Stop reason: HOSPADM

## 2018-07-25 RX ORDER — AMOXICILLIN 250 MG
1 CAPSULE ORAL DAILY
Status: DISCONTINUED | OUTPATIENT
Start: 2018-07-25 | End: 2018-07-30 | Stop reason: HOSPADM

## 2018-07-25 RX ORDER — METOPROLOL SUCCINATE 100 MG/1
100 TABLET, EXTENDED RELEASE ORAL DAILY
COMMUNITY

## 2018-07-25 RX ORDER — SEVELAMER CARBONATE 800 MG/1
800 TABLET, FILM COATED ORAL
Status: DISCONTINUED | OUTPATIENT
Start: 2018-07-25 | End: 2018-07-30 | Stop reason: HOSPADM

## 2018-07-25 RX ORDER — DEXTROSE 50 % IN WATER (D50W) INTRAVENOUS SYRINGE
25
Status: COMPLETED | OUTPATIENT
Start: 2018-07-25 | End: 2018-07-25

## 2018-07-25 RX ORDER — AMLODIPINE BESYLATE 5 MG/1
5 TABLET ORAL DAILY
Status: DISCONTINUED | OUTPATIENT
Start: 2018-07-25 | End: 2018-07-28

## 2018-07-25 RX ORDER — LABETALOL HYDROCHLORIDE 5 MG/ML
10 INJECTION, SOLUTION INTRAVENOUS
Status: COMPLETED | OUTPATIENT
Start: 2018-07-25 | End: 2018-07-25

## 2018-07-25 RX ORDER — SODIUM CHLORIDE 0.9 % (FLUSH) 0.9 %
3 SYRINGE (ML) INJECTION
Status: DISCONTINUED | OUTPATIENT
Start: 2018-07-25 | End: 2018-07-30 | Stop reason: HOSPADM

## 2018-07-25 RX ORDER — ONDANSETRON 8 MG/1
8 TABLET, ORALLY DISINTEGRATING ORAL EVERY 8 HOURS PRN
Status: DISCONTINUED | OUTPATIENT
Start: 2018-07-25 | End: 2018-07-30 | Stop reason: HOSPADM

## 2018-07-25 RX ORDER — MEGESTROL ACETATE 40 MG/ML
200 SUSPENSION ORAL
Status: DISCONTINUED | OUTPATIENT
Start: 2018-07-25 | End: 2018-07-30 | Stop reason: HOSPADM

## 2018-07-25 RX ORDER — PROMETHAZINE HYDROCHLORIDE 25 MG/1
25 TABLET ORAL EVERY 6 HOURS PRN
Status: DISCONTINUED | OUTPATIENT
Start: 2018-07-25 | End: 2018-07-30 | Stop reason: HOSPADM

## 2018-07-25 RX ORDER — RAMELTEON 8 MG/1
8 TABLET ORAL NIGHTLY PRN
Status: DISCONTINUED | OUTPATIENT
Start: 2018-07-25 | End: 2018-07-30 | Stop reason: HOSPADM

## 2018-07-25 RX ORDER — METOPROLOL SUCCINATE 100 MG/1
100 TABLET, EXTENDED RELEASE ORAL DAILY
Status: DISCONTINUED | OUTPATIENT
Start: 2018-07-26 | End: 2018-07-30 | Stop reason: HOSPADM

## 2018-07-25 RX ORDER — HYDROCODONE BITARTRATE AND ACETAMINOPHEN 5; 325 MG/1; MG/1
1 TABLET ORAL EVERY 4 HOURS PRN
Status: DISCONTINUED | OUTPATIENT
Start: 2018-07-25 | End: 2018-07-30 | Stop reason: HOSPADM

## 2018-07-25 RX ORDER — GLUCAGON 1 MG
1 KIT INJECTION
Status: DISCONTINUED | OUTPATIENT
Start: 2018-07-25 | End: 2018-07-30 | Stop reason: HOSPADM

## 2018-07-25 RX ORDER — PANTOPRAZOLE SODIUM 40 MG/1
40 TABLET, DELAYED RELEASE ORAL DAILY
Status: DISCONTINUED | OUTPATIENT
Start: 2018-07-26 | End: 2018-07-30 | Stop reason: HOSPADM

## 2018-07-25 RX ORDER — DIPHENHYDRAMINE HYDROCHLORIDE 50 MG/ML
25 INJECTION INTRAMUSCULAR; INTRAVENOUS EVERY 4 HOURS PRN
Status: DISCONTINUED | OUTPATIENT
Start: 2018-07-25 | End: 2018-07-30 | Stop reason: HOSPADM

## 2018-07-25 RX ORDER — SODIUM CHLORIDE 9 MG/ML
INJECTION, SOLUTION INTRAVENOUS ONCE
Status: COMPLETED | OUTPATIENT
Start: 2018-07-25 | End: 2018-07-25

## 2018-07-25 RX ORDER — SODIUM CHLORIDE 0.9 % (FLUSH) 0.9 %
5 SYRINGE (ML) INJECTION
Status: DISCONTINUED | OUTPATIENT
Start: 2018-07-25 | End: 2018-07-30 | Stop reason: HOSPADM

## 2018-07-25 RX ORDER — ACETAMINOPHEN 325 MG/1
650 TABLET ORAL EVERY 4 HOURS PRN
Status: DISCONTINUED | OUTPATIENT
Start: 2018-07-25 | End: 2018-07-30 | Stop reason: HOSPADM

## 2018-07-25 RX ORDER — CLONIDINE HYDROCHLORIDE 0.3 MG/1
0.3 TABLET ORAL 2 TIMES DAILY
COMMUNITY

## 2018-07-25 RX ORDER — SODIUM CHLORIDE 9 MG/ML
INJECTION, SOLUTION INTRAVENOUS
Status: DISCONTINUED | OUTPATIENT
Start: 2018-07-25 | End: 2018-07-29

## 2018-07-25 RX ORDER — CLONIDINE HYDROCHLORIDE 0.3 MG/1
0.3 TABLET ORAL
Status: COMPLETED | OUTPATIENT
Start: 2018-07-25 | End: 2018-07-25

## 2018-07-25 RX ORDER — LISINOPRIL 40 MG/1
40 TABLET ORAL DAILY
COMMUNITY

## 2018-07-25 RX ORDER — ATORVASTATIN CALCIUM 20 MG/1
40 TABLET, FILM COATED ORAL DAILY
Status: DISCONTINUED | OUTPATIENT
Start: 2018-07-26 | End: 2018-07-30 | Stop reason: HOSPADM

## 2018-07-25 RX ORDER — HYDROXYZINE HYDROCHLORIDE 25 MG/1
25 TABLET, FILM COATED ORAL 3 TIMES DAILY
COMMUNITY

## 2018-07-25 RX ORDER — LISINOPRIL 20 MG/1
40 TABLET ORAL DAILY
Status: DISCONTINUED | OUTPATIENT
Start: 2018-07-26 | End: 2018-07-30 | Stop reason: HOSPADM

## 2018-07-25 RX ORDER — IBUPROFEN 200 MG
24 TABLET ORAL
Status: DISCONTINUED | OUTPATIENT
Start: 2018-07-25 | End: 2018-07-30 | Stop reason: HOSPADM

## 2018-07-25 RX ORDER — POLYETHYLENE GLYCOL 3350 17 G/17G
17 POWDER, FOR SOLUTION ORAL 2 TIMES DAILY PRN
Status: DISCONTINUED | OUTPATIENT
Start: 2018-07-25 | End: 2018-07-30 | Stop reason: HOSPADM

## 2018-07-25 RX ORDER — MORPHINE SULFATE 4 MG/ML
2 INJECTION, SOLUTION INTRAMUSCULAR; INTRAVENOUS EVERY 4 HOURS PRN
Status: DISCONTINUED | OUTPATIENT
Start: 2018-07-25 | End: 2018-07-30 | Stop reason: HOSPADM

## 2018-07-25 RX ADMIN — CLONIDINE HYDROCHLORIDE 0.3 MG: 0.3 TABLET ORAL at 03:07

## 2018-07-25 RX ADMIN — DEXTROSE MONOHYDRATE 25 G: 25 INJECTION, SOLUTION INTRAVENOUS at 03:07

## 2018-07-25 RX ADMIN — DEXTROSE MONOHYDRATE 12.5 G: 25 INJECTION, SOLUTION INTRAVENOUS at 06:07

## 2018-07-25 RX ADMIN — SODIUM CHLORIDE 300 ML: 0.9 INJECTION, SOLUTION INTRAVENOUS at 02:07

## 2018-07-25 RX ADMIN — AMLODIPINE BESYLATE 5 MG: 5 TABLET ORAL at 01:07

## 2018-07-25 RX ADMIN — SENNOSIDES AND DOCUSATE SODIUM 1 TABLET: 8.6; 5 TABLET ORAL at 01:07

## 2018-07-25 RX ADMIN — IOHEXOL 75 ML: 350 INJECTION, SOLUTION INTRAVENOUS at 03:07

## 2018-07-25 RX ADMIN — HYDROXYZINE HYDROCHLORIDE 25 MG: 25 TABLET, FILM COATED ORAL at 08:07

## 2018-07-25 RX ADMIN — LABETALOL HYDROCHLORIDE 10 MG: 5 INJECTION, SOLUTION INTRAVENOUS at 04:07

## 2018-07-25 RX ADMIN — LABETALOL HYDROCHLORIDE 10 MG: 5 INJECTION, SOLUTION INTRAVENOUS at 03:07

## 2018-07-25 NOTE — ED NOTES
The patient is resting quietly, eyes closed, arouses easily to stimuli. Airway is open and patent, respirations are spontaneous, normal respiratory effort and rate noted, skin warm and dry, appearance: in no acute distress and resting comfortably.    LOC: The patient is resting but arouses to voice and aware of environment with an appropriate affect, the patient is oriented x 2 and speaking appropriately.  APPEARANCE: Patient resting comfortably and in no acute distress, patient is clean and well groomed, patient's clothing is properly fastened.  SKIN: The skin is warm and dry, patient has normal skin turgor and moist mucus membranes, skin intact, no breakdown or brusing noted.  MUSKULOSKELETAL: Patient moving all extremities well, no obvious swelling or deformities noted.  RESPIRATORY: Airway is open and patent, respirations are spontaneous, patient has a normal effort and rate. Breath sounds are clear and equal bilaterally.  CARDIAC: Normal heart sounds. No peripheral edema.  ABDOMEN: Soft and non tender to palpation, no distention noted. Bowel sounds present.

## 2018-07-25 NOTE — CONSULTS
[facility]  Nephrology  Consult Note    Patient Name: Tony De Leon  MRN: [id number]  Admission Date: 7/25/2018  Hospital Length of Stay: 0 days  Attending Provider: Gallo Mattson MD   Primary Care Physician: Primary Doctor No  Principal Problem:<principal problem not specified>    Consults  Subjective:     HPI: Ms. 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 Per note, was given labetalol 60 mg en route. On arrival to  ED  but increase to 190 with improvement after labetalol 10 mg IV and clonidine 0.3 mg.BS 63 >156 after dextrose.  Vascular Surgery consulted.   CTA AP   Postoperative change of endovascular aortic aneurysm repair of the infrarenal abdominal aorta with contrast extravasation along the right lateral aspect of the graft into a retroperitoneal hematoma as detailed above.  Findings are worrisome for endoleak, possibly type 3 or 4.  Recommend vascular surgery evaluation and correlation with outside imaging, as dedicated endoleak protocol was not performed.  Additional focus of persistent aortic rupture versus pseudoaneurysm along the right lateral aspect of the infrarenal abdominal aorta appears distal to the graft material.  Extensive iliofemoral vascular calcifications with bilateral femoral bypass grafts.  Right graft is occluded.  IVC filter with stenosis of the IVC just caudal to the filter.  Prominent opacification of the lower IVC and iliac veins suggest the presence of arteriovenous fistula.  Additional stable  findings as above.    Nephrology consult for hemodialysis. 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 Previously onperitoneal dialysis but transition to iHD due to peritonitis.  iHD MWF schedule 3 hrs duration via left fem AVG at [facility] .  lbs?  Last HD Monday 7/23. No residual renal function.     Past Medical History:   Diagnosis Date    AAA (abdominal aortic aneurysm) 7/16/2018    CHF (congestive heart failure)     Current smoker 07/16/2018    ESRD on hemodialysis 7/16/2018    Hypertension     Mass of head     Peripheral arterial disease 7/16/2018    Scoliosis     Severe protein-calorie malnutrition 7/17/2018       Past Surgical History:   Procedure Laterality Date    ABDOMINAL AORTIC ANEURYSM REPAIR, ENDOVASCULAR Right 7/16/2018    Procedure: Repair-Aneurysm-Abdominal Aortic-Endovascular (Aaa) 9.2min flouro time .09;  Surgeon: Good Westfall MD;  Location: [facility] OR [address] [place] [address];  Service: Peripheral Vascular;  Laterality: Right;   9.2min flouro time .09    AORTOGRAPHY N/A 7/16/2018    Procedure: AORTOGRAM;  Surgeon: Good Westfall MD;  Location: [facility] OR [address] [place] [address];  Service: Peripheral Vascular;  Laterality: N/A;    INSERTION OF VENOUS ACCESS PORT Right 7/16/2018    Procedure: INSERTION, VENOUS ACCESS PORT;  Surgeon: Good Westfall MD;  Location: [facility] OR [address] [place] [address];  Service: Peripheral Vascular;  Laterality: Right;  L CFA 5f access    KIDNEY TRANSPLANT      removal of transplanted kidney      TUBAL LIGATION         Review of patient's allergies indicates:  No Known Allergies  Current Facility-Administered Medications   Medication Frequency    acetaminophen tablet 650 mg Q4H PRN    dextrose 50% injection 12.5 g PRN    dextrose 50% injection 25 g PRN    diphenhydrAMINE injection 25 mg Q4H PRN    glucagon (human recombinant) injection 1 mg PRN    glucose chewable tablet 16 g PRN     glucose chewable tablet 24 g PRN    ondansetron disintegrating tablet 8 mg Q8H PRN    promethazine (PHENERGAN) 6.25 mg in dextrose 5 % 50 mL IVPB Q6H PRN    sodium chloride 0.9% flush 3 mL PRN     Current Outpatient Prescriptions   Medication    atorvastatin (LIPITOR) 40 MG tablet    cloNIDine (CATAPRES) 0.3 MG tablet    HYDROcodone-acetaminophen (NORCO) 5-325 mg per tablet    hydrOXYzine HCl (ATARAX) 25 MG tablet    lisinopril (PRINIVIL,ZESTRIL) 40 MG tablet    metoprolol succinate (TOPROL-XL) 100 MG 24 hr tablet    pantoprazole (PROTONIX) 40 MG tablet    sevelamer carbonate (RENVELA) 800 mg Tab    amLODIPine (NORVASC) 5 MG tablet    aspirin (ECOTRIN) 81 MG EC tablet     Family History     None        Social History Main Topics    Smoking status: Not on file    Smokeless tobacco: Not on file    Alcohol use Not on file    Drug use: Unknown    Sexual activity: Not on file     Review of Systems   Constitutional: Positive for activity change, appetite change and fatigue. Negative for chills, diaphoresis and fever.   HENT: Negative.    Eyes: Negative.    Respiratory: Negative.    Cardiovascular: Negative.    Gastrointestinal: Negative.    Endocrine: Negative.    Genitourinary:        Does not make urine   Musculoskeletal: Positive for arthralgias and back pain.   Allergic/Immunologic: Negative.    Neurological: Negative.    Hematological: Negative.    Psychiatric/Behavioral: Negative.      Objective:     Vital Signs (Most Recent):  Temp: 97.9 °F (36.6 °C) (07/25/18 0702)  Pulse: 76 (07/25/18 0816)  Resp: 18 (07/25/18 0024)  BP: (!) 140/90 (07/25/18 0816)  SpO2: 100 % (07/25/18 0816)  O2 Device (Oxygen Therapy): room air (07/25/18 0024) Vital Signs (24h Range):  Temp:  [97.7 °F (36.5 °C)-97.9 °F (36.6 °C)] 97.9 °F (36.6 °C)  Pulse:  [72-81] 76  Resp:  [18-22] 18  SpO2:  [93 %-100 %] 100 %  BP: (128-193)/() 140/90     Weight: 56.7 kg (125 lb) (07/25/18 0024)  Body mass index is 20.8 kg/m².  Body  surface area is 1.61 meters squared.    No intake/output data recorded.    Physical Exam   Constitutional: She is oriented to person, place, and time. No distress.   Thin and frail . 100% on 1 L NC   HENT:   Head: Atraumatic.   Eyes: EOM are normal.   Neck: Neck supple.   Cardiovascular: Normal rate and regular rhythm.    Pulmonary/Chest: Effort normal.   Diminished throughout   Abdominal: Soft. Bowel sounds are normal. There is no tenderness.   LLQ healed incision from previous renal allograft removal   Musculoskeletal: Normal range of motion. She exhibits no edema.   Neurological: She is alert and oriented to person, place, and time.   Skin: Skin is warm and dry.   L thigh graft good bruit   Psychiatric: She has a normal mood and affect.       Significant Labs:  All labs within the past 24 hours have been reviewed.    Significant Imaging:  Labs: Reviewed    Assessment/Plan:     ESRD (end stage renal disease)    Ms. 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 for Vascular Surgery evaluation. Per note, was given labetalol 60 mg en route. On arrival to  ED  but increase to 190 with improvement after labetalol 10 mg IV and clonidine 0.3 mg.BS 63 >156 after dextrose.  Vascular Surgery consulted.   CTA AP   Postoperative change of endovascular aortic aneurysm repair of the infrarenal abdominal aorta with contrast extravasation along the right lateral aspect of the graft into a retroperitoneal hematoma as detailed above.  Findings are worrisome for endoleak, possibly type 3 or 4.  Recommend  vascular surgery evaluation and correlation with outside imaging, as dedicated endoleak protocol was not performed.  Additional focus of persistent aortic rupture versus pseudoaneurysm along the right lateral aspect of the infrarenal abdominal aorta appears distal to the graft material.  Extensive iliofemoral vascular calcifications with bilateral femoral bypass grafts.  Right graft is occluded.  IVC filter with stenosis of the IVC just caudal to the filter.  Prominent opacification of the lower IVC and iliac veins suggest the presence of arteriovenous fistula.  Additional stable findings as above.    Nephrology consult for hemodialysis. 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 Previously onperitoneal dialysis but transition to iHD due to peritonitis.  iHD MWF schedule 3 hrs duration via left fem AVG at [facility] .  lbs?  Last HD Monday 7/23. No residual renal function. Labs reviewed.     Plan:  Vascular Surgery plan to monitor and no intervention at this time.  3 hrs duration HD for metabolic clearance. No UF.   No KT   Renal diet once able to eat                 Thank you for your consult. I will follow-up with patient. Please contact us if you have any additional questions.    Celeste Ricks, ROMIE  Nephrology  [facility]

## 2018-07-25 NOTE — HPI
53 yo F with history of HTN, ESRD on HD who initially presented with one week of epigastric abdominal pain and found to have a increasing saccular AAA. She underwent an urgent EVAR on 7/16 and was discharge on POD 2.     She returns as a transfer from an OSH with 2 days of back pain and AMS. Per OSH records, patient was found wandering in her yard when EMS arrived. Found to be severely hypoglycemic (20s) and reported to have improvement in her mental status after receiving glucose. However, OSH CT scan was concerning for aneurysmal expansion so she was transferred to [facility] ED for further evaluation.     She has had subjective weight loss over the past month. Per records, pt has a hx of non-compliance and last dialysis was 7/20. MWF HD. Of note, she has a hx of a prior renal transplant from [id number], which had to be removed secondary to infection, SBP (while on PD), CHF. Surgical hx significant for PD catheter, renal transplant and explant, tubal ligation, IVC filter unknown when placed. Per previous admission notes, she is unable to walk a block or up a flight of stairs.  No known MI/stroke. Also is noncompliant with her medications at home. On norvasc, lisinopril, and labetolol.

## 2018-07-25 NOTE — ASSESSMENT & PLAN NOTE
Ms. De Leon is a 51 yo AAF with significant history for hypertension, PAD, current 1/2 pack a day smoker, ESRD over 10 yrs sp failed kidney transplant after 5 years then renal allograft removal (at [facility], she does not dates), and recent admission here for AAA sp EVAR on 7/16 who initially presented to [facility] in [place], [place] for lower back pain not associated with abdominal pain/N/V x 2-3 days in which CT showing aneurysm leak then [place] [facility] [place] Surgery evaluation. Per note, was given labetalol 60 mg en route. On arrival to  ED  but increase to 190 with improvement after labetalol 10 mg IV and clonidine 0.3 mg.BS 63 >156 after dextrose.  Vascular Surgery consulted.   CTA AP   Postoperative change of endovascular aortic aneurysm repair of the infrarenal abdominal aorta with contrast extravasation along the right lateral aspect of the graft into a retroperitoneal hematoma as detailed above.  Findings are worrisome for endoleak, possibly type 3 or 4.  Recommend vascular surgery evaluation and correlation with outside imaging, as dedicated endoleak protocol was not performed.  Additional focus of persistent aortic rupture versus pseudoaneurysm along the right lateral aspect of the infrarenal abdominal aorta appears distal to the graft material.  Extensive iliofemoral vascular calcifications with bilateral femoral bypass grafts.  Right graft is occluded.  IVC filter with stenosis of the IVC just caudal to the filter.  Prominent opacification of the lower IVC and iliac veins suggest the presence of arteriovenous fistula.  Additional stable findings as above.    Nephrology consult for hemodialysis. 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 Previously onperitoneal dialysis but transition to iHD due to peritonitis.  iHD MWF schedule 3 hrs duration via left fem AVG at [facility] .   lbs?  Last HD Monday 7/23. No residual renal function. Labs reviewed.     Plan:  Vascular Surgery plan to monitor and no intervention at this time.  3 hrs duration HD for metabolic clearance. No UF.   No KT   Renal diet once able to eat

## 2018-07-25 NOTE — ED PROVIDER NOTES
Encounter Date: 7/24/2018    SCRIBE #1 NOTE: I, Steve Ramirez, am scribing for, and in the presence of,  Dr. Marcial. I have scribed the entire note.       History     Chief Complaint   Patient presents with    AAA Dissection Transfer     Patient transfered from [facility] in [place]. Patient brought in by EMS for evaluation of altered mental status and lower back pain. Upon arrival patient's initial CBG was 30. [facility] treated hypoglycemia, patient became more responsive and reported lower back pain. Patient had AAA repair done last week. CT showed aneurysm leaking and enlarging. Patient is also a dialysis patient, M/W/F     Time patient was seen by the provider: 12:35 AM      The patient is a 52 y.o. Female who is a current smoker with co-morbidities including: ESRD on HD (MWF), PAD, CHF, and HTN as well as recent AAA repair 1 week ago who presents to the ED with a complaint of post-op complication. EMS found the pt today wandering in her yard confused. EMS brought pt to [facility] in [facility] for evaluation of altered mental status and lower back pain. Upon arrival, pt had a CBG of 30.CT scan at [facility] shows a leakage around aneurysm repair. Pt was then transferred here. In route, pt was given a total of 60 mg of labetalol for BP control. Pt denies any pain and is without complaints at this time.        The history is provided by the patient, the EMS personnel and medical records. History limited by: AMS.     Review of patient's allergies indicates:  No Known Allergies  Past Medical History:   Diagnosis Date    AAA (abdominal aortic aneurysm) 7/16/2018    CHF (congestive heart failure)     Current smoker 07/16/2018    ESRD on hemodialysis 7/16/2018    Hypertension     Mass of head     Peripheral arterial disease 7/16/2018    Scoliosis     Severe protein-calorie malnutrition 7/17/2018     Past Surgical History:   Procedure Laterality Date    ABDOMINAL AORTIC ANEURYSM REPAIR,  ENDOVASCULAR Right 7/16/2018    Procedure: Repair-Aneurysm-Abdominal Aortic-Endovascular (Aaa) 9.2min flouro time .09;  Surgeon: Good Westfall MD;  Location: [facility] OR [address] [place] [address];  Service: Peripheral Vascular;  Laterality: Right;   9.2min flouro time .09    AORTOGRAPHY N/A 7/16/2018    Procedure: AORTOGRAM;  Surgeon: Good Westfall MD;  [facility];  Service: Peripheral Vascular;  Laterality: N/A;    INSERTION OF VENOUS ACCESS PORT Right 7/16/2018    Procedure: INSERTION, VENOUS ACCESS PORT;  Surgeon: Good Westfall MD;  [facility];  Service: Peripheral Vascular;  Laterality: Right;  L CFA 5f access    KIDNEY TRANSPLANT      removal of transplanted kidney      TUBAL LIGATION       No family history on file.  Social History   Substance Use Topics    Smoking status: Not on file    Smokeless tobacco: Not on file    Alcohol use Not on file     Review of Systems   Constitutional: Negative for fever.        + hypoglycemia    HENT: Negative for sore throat.    Respiratory: Negative for shortness of breath.    Cardiovascular: Negative for chest pain.   Gastrointestinal: Negative for abdominal pain and nausea.   Genitourinary: Negative for dysuria and pelvic pain.   Musculoskeletal: Positive for back pain (lower; not currently).   Skin: Negative for rash.   Neurological: Negative for weakness and headaches.   Hematological: Does not bruise/bleed easily.   Psychiatric/Behavioral: Positive for confusion.       Physical Exam     Initial Vitals [07/25/18 0024]   BP Pulse Resp Temp SpO2   136/80 78 18 -- 95 %      MAP       --         Physical Exam    Nursing note and vitals reviewed.  Constitutional: She appears well-developed and well-nourished. No distress.   HENT:   Head: Normocephalic and atraumatic.   Eyes: Conjunctivae and EOM are normal. Pupils are equal, round, and reactive to light.   Neck: Normal range of motion. Neck supple. No tracheal deviation present. No JVD  present.   Cardiovascular: Normal rate, regular rhythm, normal heart sounds and intact distal pulses.   Pulmonary/Chest: Breath sounds normal. No stridor. No respiratory distress. She has no wheezes. She has no rhonchi. She has no rales.   Abdominal: Soft.   Musculoskeletal: Normal range of motion. She exhibits no edema or tenderness.   Neurological: She is alert and oriented to person, place, and time. She has normal strength. No cranial nerve deficit or sensory deficit.   Skin: Skin is warm and dry. No rash noted.         ED Course   Procedures  Labs Reviewed   POCT GLUCOSE   POCT GLUCOSE MONITORING CONTINUOUS          Imaging Results    None          Medical Decision Making:   History:   Old Medical Records: I decided to obtain old medical records.  Initial Assessment:   51 yo female presents here from outside facility with post-op complication from abd aneurysm repair 1 week ago. Will consult Vascular Surgery at this time.   Clinical Tests:   Lab Tests: Ordered and Reviewed  Radiological Study: Ordered and Reviewed  Other:   I have discussed this case with another health care provider.            Scribe Attestation:   Scribe #1: I performed the above scribed service and the documentation accurately describes the services I performed. I attest to the accuracy of the note.    Attending Attestation:             Attending ED Notes:   12:40 AM  Spoke with Vascular Surgery. They will come evaluate the pt at this time.     2:11 AM  Spoke with Vascular Surgery who states they will need to repeat CT imaging at this time because the image from outside facility could not be loaded. Awaiting final recommendations from Vascular Surgery.     Update note: Patient stable and able to arouse with voice. Awaiting repeat CT scan. Patient sign out to Dr. Jacob.    2:57 AM 7/25/2018  Sarah Marcial MD            I, Dr. Sarah Marcial, personally performed the services described in this documentation. All medical record entries  made by the scribe were at my direction and in my presence.  I have reviewed the chart and agree that the record reflects my personal performance and is accurate and complete. Sarah Marcial MD.  2:56 AM 07/25/2018             Clinical Impression:   There were no encounter diagnoses.                             Sarah Marcial MD  07/25/18 0257

## 2018-07-25 NOTE — ED NOTES
Patient identifiers verified and correct for Tony De Leon.    LOC: The patient is awake, alert and aware of environment with an appropriate affect, the patient is oriented x 3 and speaking appropriately.  APPEARANCE: Patient resting comfortably and in no acute distress, patient is clean and well groomed, patient's clothing properly fastened.  SKIN: The skin is warm and dry, color consistent with ethnicity, patient has normal skin turgor and moist mucus membranes, skin intact, no breakdown or brusing noted.  MUSKULOSKELETAL: Normal range of motion. Patient able to move all extremities.  RESPIRATORY: Airway is open and patent, respirations are spontaneous, patient has a normal effort and rate, no accessory muscle use noted. Lungs clear to auscultation.  CARDIAC: Patient has a normal rate and rhythm, no peripheral edema noted, capillary refill < 3 seconds.  ABDOMEN: Soft and non tender to palpation, no distention noted. Normal bowel sounds X4  NEUROLOGIC: Eyes open spontaneously, behavior appropriate to situation, follows commands, facial expression symmetrical, bilateral hand grasp equal and even, purposeful motor response noted, normal sensation in all extremities when touched with a finger.

## 2018-07-25 NOTE — ASSESSMENT & PLAN NOTE
51 y/o female with ESRD on HD (MWF), CHF, PAD, HTN, AAA s/p EVAR 7/16/18 and hx of medical non-compliance readmitted with AMS and found to have endoleak.    Discussed patient with Dr. Westfall. Unfortunately, due to her multiple medical co-morbidities she is not a candidate for open surgical repair and due to her anatomy is not a candidate for endovascular intervention.   Recommend evaluation by hospital medicine due to AMS and hypoglycemic episodes.  Recommend nephrology consult for HD. Likely needs dialysis today as last reported dialysis was on 7/20.   Will continue to follow along.     Above plan was discussed with the patient and her family.

## 2018-07-25 NOTE — ED NOTES
The patient is resting quietly, eyes closed, arouses easily to stimuli. Airway is open and patent, respirations are spontaneous, normal respiratory effort and rate noted, skin warm and dry, appearance: in no acute distress and resting comfortably.    Patient asked for lights to be dimmed

## 2018-07-25 NOTE — HPI
Ms. De Leon is a 53 yo AAF with significant history for hypertension, PAD, current 1/2 pack a day smoker, ESRD over 10 yrs sp failed kidney transplant after 5 years then renal allograft removal (at [facility], she does not dates), and recent admission here for AAA sp EVAR on 7/16 who initially presented to [facility] in [place], [place] for lower back pain not associated with abdominal pain/N/V x 2-3 days in which CT showing aneurysm leak then transferred to [place] for Vascular Surgery evaluation. Per note, was given labetalol 60 mg en route. On arrival to  ED  but increase to 190 with improvement after labetalol 10 mg IV and clonidine 0.3 mg.BS 63 >156 after dextrose.  Vascular Surgery consulted.   CTA AP   Postoperative change of endovascular aortic aneurysm repair of the infrarenal abdominal aorta with contrast extravasation along the right lateral aspect of the graft into a retroperitoneal hematoma as detailed above.  Findings are worrisome for endoleak, possibly type 3 or 4.  Recommend vascular surgery evaluation and correlation with outside imaging, as dedicated endoleak protocol was not performed.  Additional focus of persistent aortic rupture versus pseudoaneurysm along the right lateral aspect of the infrarenal abdominal aorta appears distal to the graft material.  Extensive iliofemoral vascular calcifications with bilateral femoral bypass grafts.  Right graft is occluded.  IVC filter with stenosis of the IVC just caudal to the filter.  Prominent opacification of the lower IVC and iliac veins suggest the presence of arteriovenous fistula.  Additional stable findings as above.    Nephrology consult for hemodialysis. ESRD over 10 yrs sp failed kidney transplant after 5 years then renal allograft removal due to infection (at [facility], she does not dates). Previously onperitoneal dialysis but transition to iHD due to peritonitis.  iHD MWF schedule 3 hrs duration via left fem AVG at [facility] .   lbs?  Last HD Monday 7/23. No residual renal function.

## 2018-07-25 NOTE — ED NOTES
The patient is resting quietly, eyes closed, arouses easily to stimuli. Airway is open and patent, respirations are spontaneous, normal respiratory effort and rate noted, skin warm and dry, appearance: in no acute distress and resting comfortably.    Patient/family member aware that they are being admitted to hospital. Awaiting bed assignment at this time. Informed patient/family that nurse will keep updating patient status. Will continue to monitor at this time

## 2018-07-25 NOTE — SUBJECTIVE & OBJECTIVE
Past Medical History:   Diagnosis Date    AAA (abdominal aortic aneurysm) 7/16/2018    CHF (congestive heart failure)     Current smoker 07/16/2018    ESRD on hemodialysis 7/16/2018    Hypertension     Mass of head     Peripheral arterial disease 7/16/2018    Scoliosis     Severe protein-calorie malnutrition 7/17/2018       Past Surgical History:   Procedure Laterality Date    ABDOMINAL AORTIC ANEURYSM REPAIR, ENDOVASCULAR Right 7/16/2018    Procedure: Repair-Aneurysm-Abdominal Aortic-Endovascular (Aaa) 9.2min flouro time .09;  Surgeon: Good Westfall MD;  [facility];  Service: Peripheral Vascular;  Laterality: Right;   9.2min flouro time .09    AORTOGRAPHY N/A 7/16/2018    Procedure: AORTOGRAM;  Surgeon: Good Westfall MD;  [facility];  Service: Peripheral Vascular;  Laterality: N/A;    INSERTION OF VENOUS ACCESS PORT Right 7/16/2018    Procedure: INSERTION, VENOUS ACCESS PORT;  Surgeon: Good Westfall MD;  [facility];  Service: Peripheral Vascular;  Laterality: Right;  L CFA 5f access    KIDNEY TRANSPLANT      removal of transplanted kidney      TUBAL LIGATION         Review of patient's allergies indicates:  No Known Allergies  Current Facility-Administered Medications   Medication Frequency    acetaminophen tablet 650 mg Q4H PRN    dextrose 50% injection 12.5 g PRN    dextrose 50% injection 25 g PRN    diphenhydrAMINE injection 25 mg Q4H PRN    glucagon (human recombinant) injection 1 mg PRN    glucose chewable tablet 16 g PRN    glucose chewable tablet 24 g PRN    ondansetron disintegrating tablet 8 mg Q8H PRN    promethazine (PHENERGAN) 6.25 mg in dextrose 5 % 50 mL IVPB Q6H PRN    sodium chloride 0.9% flush 3 mL PRN     Current Outpatient Prescriptions   Medication    atorvastatin (LIPITOR) 40 MG tablet    cloNIDine (CATAPRES) 0.3 MG tablet    HYDROcodone-acetaminophen (NORCO) 5-325 mg per tablet    hydrOXYzine HCl  (ATARAX) 25 MG tablet    lisinopril (PRINIVIL,ZESTRIL) 40 MG tablet    metoprolol succinate (TOPROL-XL) 100 MG 24 hr tablet    pantoprazole (PROTONIX) 40 MG tablet    sevelamer carbonate (RENVELA) 800 mg Tab    amLODIPine (NORVASC) 5 MG tablet    aspirin (ECOTRIN) 81 MG EC tablet     Family History     None        Social History Main Topics    Smoking status: Not on file    Smokeless tobacco: Not on file    Alcohol use Not on file    Drug use: Unknown    Sexual activity: Not on file     Review of Systems   Constitutional: Positive for activity change, appetite change and fatigue. Negative for chills, diaphoresis and fever.   HENT: Negative.    Eyes: Negative.    Respiratory: Negative.    Cardiovascular: Negative.    Gastrointestinal: Negative.    Endocrine: Negative.    Genitourinary:        Does not make urine   Musculoskeletal: Positive for arthralgias and back pain.   Allergic/Immunologic: Negative.    Neurological: Negative.    Hematological: Negative.    Psychiatric/Behavioral: Negative.      Objective:     Vital Signs (Most Recent):  Temp: 97.9 °F (36.6 °C) (07/25/18 0702)  Pulse: 76 (07/25/18 0816)  Resp: 18 (07/25/18 0024)  BP: (!) 140/90 (07/25/18 0816)  SpO2: 100 % (07/25/18 0816)  O2 Device (Oxygen Therapy): room air (07/25/18 0024) Vital Signs (24h Range):  Temp:  [97.7 °F (36.5 °C)-97.9 °F (36.6 °C)] 97.9 °F (36.6 °C)  Pulse:  [72-81] 76  Resp:  [18-22] 18  SpO2:  [93 %-100 %] 100 %  BP: (128-193)/() 140/90     Weight: 56.7 kg (125 lb) (07/25/18 0024)  Body mass index is 20.8 kg/m².  Body surface area is 1.61 meters squared.    No intake/output data recorded.    Physical Exam   Constitutional: She is oriented to person, place, and time. No distress.   Thin and frail . 100% on 1 L NC   HENT:   Head: Atraumatic.   Eyes: EOM are normal.   Neck: Neck supple.   Cardiovascular: Normal rate and regular rhythm.    Pulmonary/Chest: Effort normal.   Diminished throughout   Abdominal: Soft. Bowel  sounds are normal. There is no tenderness.   LLQ healed incision from previous renal allograft removal   Musculoskeletal: Normal range of motion. She exhibits no edema.   Neurological: She is alert and oriented to person, place, and time.   Skin: Skin is warm and dry.   L thigh graft good bruit   Psychiatric: She has a normal mood and affect.       Significant Labs:  All labs within the past 24 hours have been reviewed.    Significant Imaging:  Labs: Reviewed

## 2018-07-25 NOTE — ED NOTES
Bed: [facility]  Expected date:   Expected time:   Means of arrival:   Comments:

## 2018-07-25 NOTE — CONSULTS
[facility]  Vascular Surgery  Consult Note    Inpatient consult to Vascular Surgery  Consult performed by: DANYELL TEMPLETON  Consult ordered by: LIAT HERNÁNDEZ        Subjective:     Chief Complaint/Reason for Admission: AMS/back pain    History of Present Illness: 53 yo F with history of HTN, ESRD on HD who initially presented with one week of epigastric abdominal pain and found to have a increasing saccular AAA. She underwent an urgent EVAR on 7/16 and was discharge on POD 2.     She returns as a transfer from an OSH with 2 days of back pain and AMS. Per OSH records, patient was found wandering in her yard when EMS arrived. Found to be severely hypoglycemic (20s) and reported to have improvement in her mental status after receiving glucose. However, OSH CT scan was concerning for aneurysmal expansion so she was transferred to [facility] for further evaluation.     She has had subjective weight loss over the past month. Per records, pt has a hx of non-compliance and last dialysis was 7/20. MWF HD. Of note, she has a hx of a prior renal transplant from [id number], which had to be removed secondary to infection, SBP (while on PD), CHF. Surgical hx significant for PD catheter, renal transplant and explant, tubal ligation, IVC filter unknown when placed. Per previous admission notes, she is unable to walk a block or up a flight of stairs.  No known MI/stroke. Also is noncompliant with her medications at home. On norvasc, lisinopril, and labetolol.      (Not in a hospital admission)    Review of patient's allergies indicates:  No Known Allergies    Past Medical History:   Diagnosis Date    AAA (abdominal aortic aneurysm) 7/16/2018    CHF (congestive heart failure)     Current smoker 07/16/2018    ESRD on hemodialysis 7/16/2018    Hypertension     Mass of head     Peripheral arterial disease 7/16/2018    Scoliosis     Severe protein-calorie malnutrition 7/17/2018     Past Surgical  History:   Procedure Laterality Date    ABDOMINAL AORTIC ANEURYSM REPAIR, ENDOVASCULAR Right 7/16/2018    Procedure: Repair-Aneurysm-Abdominal Aortic-Endovascular (Aaa) 9.2min flouro time .09;  Surgeon: Good Westfall MD;  Location: [facility] OR [address] [place] [address];  Service: Peripheral Vascular;  Laterality: Right;   9.2min flouro time .09    AORTOGRAPHY N/A 7/16/2018    Procedure: AORTOGRAM;  Surgeon: Good Westfall MD;  [facility];  Service: Peripheral Vascular;  Laterality: N/A;    INSERTION OF VENOUS ACCESS PORT Right 7/16/2018    Procedure: INSERTION, VENOUS ACCESS PORT;  Surgeon: Good Westfall MD;  Location: [facility] OR [address] [place] [address];  Service: Peripheral Vascular;  Laterality: Right;  L CFA 5f access    KIDNEY TRANSPLANT      removal of transplanted kidney      TUBAL LIGATION       Family History     None        Social History Main Topics    Smoking status: Not on file    Smokeless tobacco: Not on file    Alcohol use Not on file    Drug use: Unknown    Sexual activity: Not on file     Review of Systems   Unable to perform ROS: Mental status change     Objective:     Vital Signs (Most Recent):  Temp: 97.7 °F (36.5 °C) (07/25/18 0358)  Pulse: 72 (07/25/18 0547)  Resp: 18 (07/25/18 0024)  BP: (!) 140/89 (07/25/18 0547)  SpO2: 100 % (07/25/18 0547) Vital Signs (24h Range):  Temp:  [97.7 °F (36.5 °C)] 97.7 °F (36.5 °C)  Pulse:  [72-81] 72  Resp:  [18-22] 18  SpO2:  [93 %-100 %] 100 %  BP: (128-193)/() 140/89     Weight: 56.7 kg (125 lb)  Body mass index is 20.8 kg/m².    Physical Exam   Constitutional:   Cachetic with temporal muscle wasting   HENT:   Head: Atraumatic.   Eyes: EOM are normal. No scleral icterus.   Neck: Neck supple. No tracheal deviation present.   Cardiovascular: Normal rate and regular rhythm.    Pulmonary/Chest: Effort normal. No respiratory distress.   Abdominal: Soft. She exhibits no distension. There is no tenderness.   Neurological:   Lethargic but arouseable;  oriented to person, place, time and situation  Falling asleep between questions   Vitals reviewed.      Significant Labs:  All pertinent labs from the last 24 hours have been reviewed.    Significant Diagnostics:  I have reviewed all pertinent imaging results/findings within the past 24 hours.    Assessment/Plan:     Endoleak of aortic graft    53 y/o female with ESRD on HD (MWF), CHF, PAD, HTN, AAA s/p EVAR 7/16/18 and hx of medical non-compliance readmitted with AMS and found to have endoleak.    Discussed patient with Dr. Westfall. Unfortunately, due to her multiple medical co-morbidities she is not a candidate for open surgical repair and due to her anatomy is not a candidate for endovascular intervention.   Recommend evaluation by hospital medicine due to AMS and hypoglycemic episodes.  Recommend nephrology consult for HD. Likely needs dialysis today as last reported dialysis was on 7/20.   Will continue to follow along.     Above plan was discussed with the patient and her family.           Pipo Stephenson MD  Vascular Surgery  [facility]    Vascular Attending  Agree with above  Very frail 53 yo woman with multiple comorbidities - underwent an urgent attempt to seal a large saccular AAA with a 23mm aortic cuff.  Returns with growing hypoglycemia, lethargy, decreased po intake and failure to thrive; does have bone lytic lesion (sacrum).  Clinically, she is asymptomatic from the AAA for now.  She does still have flow into the saccular AAA; this may or may not be able to be treated with a longer aortic cuff (70mm), which we did not have on the shelf during emergency but could be ordered.    Spoke with  twice (in early AM and in PM).  Also, spoke with daughter, Gillian (RN, [phone]).  Ffamily is clear now that they do not want another surgery or procedure - even if minimally invasive.  Daughter has spoken with patient; family does not want a repeat procedure or surgery.  They want  hospice care once she goes home.     Good Westfall MD FACS

## 2018-07-25 NOTE — PROGRESS NOTES
Pt arrived to unit via stretcher. Maintenance pt. Pt alert & stable. Accessed LT AVG x2 16g needles without difficulty, duration 3.0hrs, Qb 350 ml/min, Qd 800ml/min, 0 UFG, orders reviewed.

## 2018-07-25 NOTE — SUBJECTIVE & OBJECTIVE
(Not in a hospital admission)    Review of patient's allergies indicates:  No Known Allergies    Past Medical History:   Diagnosis Date    AAA (abdominal aortic aneurysm) 7/16/2018    CHF (congestive heart failure)     Current smoker 07/16/2018    ESRD on hemodialysis 7/16/2018    Hypertension     Mass of head     Peripheral arterial disease 7/16/2018    Scoliosis     Severe protein-calorie malnutrition 7/17/2018     Past Surgical History:   Procedure Laterality Date    ABDOMINAL AORTIC ANEURYSM REPAIR, ENDOVASCULAR Right 7/16/2018    Procedure: Repair-Aneurysm-Abdominal Aortic-Endovascular (Aaa) 9.2min flouro time .09;  Surgeon: Good Westfall MD;  [facility];  Service: Peripheral Vascular;  Laterality: Right;   9.2min flouro time .09    AORTOGRAPHY N/A 7/16/2018    Procedure: AORTOGRAM;  Surgeon: Good Westfall MD;  [facility];  Service: Peripheral Vascular;  Laterality: N/A;    INSERTION OF VENOUS ACCESS PORT Right 7/16/2018    Procedure: INSERTION, VENOUS ACCESS PORT;  Surgeon: Good Westfall MD;  [facility];  Service: Peripheral Vascular;  Laterality: Right;  L CFA 5f access    KIDNEY TRANSPLANT      removal of transplanted kidney      TUBAL LIGATION       Family History     None        Social History Main Topics    Smoking status: Not on file    Smokeless tobacco: Not on file    Alcohol use Not on file    Drug use: Unknown    Sexual activity: Not on file     Review of Systems   Unable to perform ROS: Mental status change     Objective:     Vital Signs (Most Recent):  Temp: 97.7 °F (36.5 °C) (07/25/18 0358)  Pulse: 72 (07/25/18 0547)  Resp: 18 (07/25/18 0024)  BP: (!) 140/89 (07/25/18 0547)  SpO2: 100 % (07/25/18 0547) Vital Signs (24h Range):  Temp:  [97.7 °F (36.5 °C)] 97.7 °F (36.5 °C)  Pulse:  [72-81] 72  Resp:  [18-22] 18  SpO2:  [93 %-100 %] 100 %  BP: (128-193)/() 140/89     Weight: 56.7 kg (125 lb)  Body mass  index is 20.8 kg/m².    Physical Exam   Constitutional:   Cachetic with temporal muscle wasting   HENT:   Head: Atraumatic.   Eyes: EOM are normal. No scleral icterus.   Neck: Neck supple. No tracheal deviation present.   Cardiovascular: Normal rate and regular rhythm.    Pulmonary/Chest: Effort normal. No respiratory distress.   Abdominal: Soft. She exhibits no distension. There is no tenderness.   Neurological:   Lethargic but arouseable; oriented to person, place, time and situation  Falling asleep between questions   Vitals reviewed.      Significant Labs:  All pertinent labs from the last 24 hours have been reviewed.    Significant Diagnostics:  I have reviewed all pertinent imaging results/findings within the past 24 hours.

## 2018-07-26 ENCOUNTER — TELEPHONE (OUTPATIENT)
Dept: VASCULAR SURGERY | Facility: CLINIC | Age: 53
End: 2018-07-26

## 2018-07-26 LAB
ALBUMIN SERPL BCP-MCNC: 2.5 G/DL
ALBUMIN SERPL BCP-MCNC: 2.7 G/DL
ALBUMIN SERPL ELPH-MCNC: 2.95 G/DL
ALP SERPL-CCNC: 104 U/L
ALP SERPL-CCNC: 105 U/L
ALPHA1 GLOB SERPL ELPH-MCNC: 0.55 G/DL
ALPHA2 GLOB SERPL ELPH-MCNC: 0.96 G/DL
ALT SERPL W/O P-5'-P-CCNC: <5 U/L
ALT SERPL W/O P-5'-P-CCNC: <5 U/L
ANION GAP SERPL CALC-SCNC: 11 MMOL/L
ANION GAP SERPL CALC-SCNC: 14 MMOL/L
AST SERPL-CCNC: 16 U/L
AST SERPL-CCNC: 17 U/L
B-GLOBULIN SERPL ELPH-MCNC: 0.9 G/DL
BASOPHILS # BLD AUTO: 0.04 K/UL
BASOPHILS NFR BLD: 0.8 %
BILIRUB SERPL-MCNC: 0.6 MG/DL
BILIRUB SERPL-MCNC: 0.6 MG/DL
BUN SERPL-MCNC: 12 MG/DL
BUN SERPL-MCNC: 15 MG/DL
C PEPTIDE SERPL-MCNC: 1.04 NG/ML
CALCIUM SERPL-MCNC: 8.3 MG/DL
CALCIUM SERPL-MCNC: 8.8 MG/DL
CHLORIDE SERPL-SCNC: 100 MMOL/L
CHLORIDE SERPL-SCNC: 99 MMOL/L
CO2 SERPL-SCNC: 22 MMOL/L
CO2 SERPL-SCNC: 25 MMOL/L
CORTIS SERPL-MCNC: 16.8 UG/DL
CREAT SERPL-MCNC: 5.9 MG/DL
CREAT SERPL-MCNC: 6.9 MG/DL
DIFFERENTIAL METHOD: ABNORMAL
EOSINOPHIL # BLD AUTO: 0.2 K/UL
EOSINOPHIL NFR BLD: 4.3 %
ERYTHROCYTE [DISTWIDTH] IN BLOOD BY AUTOMATED COUNT: 17.5 %
ERYTHROCYTE [DISTWIDTH] IN BLOOD BY AUTOMATED COUNT: 17.7 %
EST. GFR  (AFRICAN AMERICAN): 7.1 ML/MIN/1.73 M^2
EST. GFR  (AFRICAN AMERICAN): 8.6 ML/MIN/1.73 M^2
EST. GFR  (NON AFRICAN AMERICAN): 6.2 ML/MIN/1.73 M^2
EST. GFR  (NON AFRICAN AMERICAN): 7.5 ML/MIN/1.73 M^2
ESTIMATED AVG GLUCOSE: 77 MG/DL
FERRITIN SERPL-MCNC: 2628 NG/ML
GAMMA GLOB SERPL ELPH-MCNC: 1.63 G/DL
GLUCOSE SERPL-MCNC: 68 MG/DL
GLUCOSE SERPL-MCNC: 77 MG/DL
HBA1C MFR BLD HPLC: 4.3 %
HCT VFR BLD AUTO: 33 %
HCT VFR BLD AUTO: 33.4 %
HGB BLD-MCNC: 10 G/DL
HGB BLD-MCNC: 10.1 G/DL
IMM GRANULOCYTES # BLD AUTO: 0.03 K/UL
IMM GRANULOCYTES NFR BLD AUTO: 0.6 %
INR PPP: 1
INSULIN COLLECTION INTERVAL: 400
INSULIN SERPL-ACNC: 1.1 UU/ML
IRON SERPL-MCNC: 80 UG/DL
LYMPHOCYTES # BLD AUTO: 0.7 K/UL
LYMPHOCYTES NFR BLD: 12.8 %
MAGNESIUM SERPL-MCNC: 1.9 MG/DL
MCH RBC QN AUTO: 28 PG
MCH RBC QN AUTO: 28.3 PG
MCHC RBC AUTO-ENTMCNC: 29.9 G/DL
MCHC RBC AUTO-ENTMCNC: 30.6 G/DL
MCV RBC AUTO: 91 FL
MCV RBC AUTO: 95 FL
MONOCYTES # BLD AUTO: 0.9 K/UL
MONOCYTES NFR BLD: 17.5 %
NEUTROPHILS # BLD AUTO: 3.3 K/UL
NEUTROPHILS NFR BLD: 64 %
NRBC BLD-RTO: 0 /100 WBC
PHOSPHATE SERPL-MCNC: 3.7 MG/DL
PHOSPHATE SERPL-MCNC: 4.5 MG/DL
PLATELET # BLD AUTO: 106 K/UL
PLATELET # BLD AUTO: 114 K/UL
PMV BLD AUTO: 10.8 FL
PMV BLD AUTO: 11 FL
POCT GLUCOSE: 121 MG/DL (ref 70–110)
POCT GLUCOSE: 53 MG/DL (ref 70–110)
POCT GLUCOSE: 60 MG/DL (ref 70–110)
POCT GLUCOSE: 64 MG/DL (ref 70–110)
POCT GLUCOSE: 91 MG/DL (ref 70–110)
POCT GLUCOSE: 92 MG/DL (ref 70–110)
POTASSIUM SERPL-SCNC: 4 MMOL/L
POTASSIUM SERPL-SCNC: 4.3 MMOL/L
PROT SERPL-MCNC: 7 G/DL
PROT SERPL-MCNC: 7.5 G/DL
PROT SERPL-MCNC: 7.7 G/DL
PROTHROMBIN TIME: 10.3 SEC
PTH-INTACT SERPL-MCNC: 144 PG/ML
RBC # BLD AUTO: 3.53 M/UL
RBC # BLD AUTO: 3.61 M/UL
SATURATED IRON: 50 %
SODIUM SERPL-SCNC: 135 MMOL/L
SODIUM SERPL-SCNC: 136 MMOL/L
TOTAL IRON BINDING CAPACITY: 161 UG/DL
TRANSFERRIN SERPL-MCNC: 109 MG/DL
TSH SERPL DL<=0.005 MIU/L-ACNC: 0.78 UIU/ML
WBC # BLD AUTO: 5.15 K/UL
WBC # BLD AUTO: 5.82 K/UL

## 2018-07-26 PROCEDURE — 84165 PROTEIN E-PHORESIS SERUM: CPT

## 2018-07-26 PROCEDURE — G8989 SELF CARE D/C STATUS: HCPCS | Mod: CI

## 2018-07-26 PROCEDURE — 80053 COMPREHEN METABOLIC PANEL: CPT

## 2018-07-26 PROCEDURE — 11000001 HC ACUTE MED/SURG PRIVATE ROOM

## 2018-07-26 PROCEDURE — 84100 ASSAY OF PHOSPHORUS: CPT | Mod: 91

## 2018-07-26 PROCEDURE — 83970 ASSAY OF PARATHORMONE: CPT

## 2018-07-26 PROCEDURE — 99232 SBSQ HOSP IP/OBS MODERATE 35: CPT | Mod: ,,, | Performed by: HOSPITALIST

## 2018-07-26 PROCEDURE — 36415 COLL VENOUS BLD VENIPUNCTURE: CPT

## 2018-07-26 PROCEDURE — 85027 COMPLETE CBC AUTOMATED: CPT

## 2018-07-26 PROCEDURE — G8980 MOBILITY D/C STATUS: HCPCS | Mod: CI

## 2018-07-26 PROCEDURE — G8978 MOBILITY CURRENT STATUS: HCPCS | Mod: CI

## 2018-07-26 PROCEDURE — 84681 ASSAY OF C-PEPTIDE: CPT

## 2018-07-26 PROCEDURE — G8987 SELF CARE CURRENT STATUS: HCPCS | Mod: CI

## 2018-07-26 PROCEDURE — G8979 MOBILITY GOAL STATUS: HCPCS | Mod: CI

## 2018-07-26 PROCEDURE — G8988 SELF CARE GOAL STATUS: HCPCS | Mod: CI

## 2018-07-26 PROCEDURE — 84443 ASSAY THYROID STIM HORMONE: CPT

## 2018-07-26 PROCEDURE — 82533 TOTAL CORTISOL: CPT

## 2018-07-26 PROCEDURE — 83540 ASSAY OF IRON: CPT

## 2018-07-26 PROCEDURE — 86334 IMMUNOFIX E-PHORESIS SERUM: CPT

## 2018-07-26 PROCEDURE — 85610 PROTHROMBIN TIME: CPT

## 2018-07-26 PROCEDURE — 83036 HEMOGLOBIN GLYCOSYLATED A1C: CPT

## 2018-07-26 PROCEDURE — 94761 N-INVAS EAR/PLS OXIMETRY MLT: CPT

## 2018-07-26 PROCEDURE — 25000003 PHARM REV CODE 250: Performed by: HOSPITALIST

## 2018-07-26 PROCEDURE — 97165 OT EVAL LOW COMPLEX 30 MIN: CPT

## 2018-07-26 PROCEDURE — 86334 IMMUNOFIX E-PHORESIS SERUM: CPT | Mod: 26,,, | Performed by: PATHOLOGY

## 2018-07-26 PROCEDURE — 84165 PROTEIN E-PHORESIS SERUM: CPT | Mod: 26,,, | Performed by: PATHOLOGY

## 2018-07-26 PROCEDURE — 97161 PT EVAL LOW COMPLEX 20 MIN: CPT

## 2018-07-26 PROCEDURE — 85025 COMPLETE CBC W/AUTO DIFF WBC: CPT

## 2018-07-26 PROCEDURE — S0179 MEGESTROL 20 MG: HCPCS | Performed by: HOSPITALIST

## 2018-07-26 PROCEDURE — 83525 ASSAY OF INSULIN: CPT

## 2018-07-26 PROCEDURE — 82728 ASSAY OF FERRITIN: CPT

## 2018-07-26 PROCEDURE — 83735 ASSAY OF MAGNESIUM: CPT

## 2018-07-26 RX ADMIN — SENNOSIDES AND DOCUSATE SODIUM 1 TABLET: 8.6; 5 TABLET ORAL at 09:07

## 2018-07-26 RX ADMIN — AMLODIPINE BESYLATE 5 MG: 5 TABLET ORAL at 09:07

## 2018-07-26 RX ADMIN — LISINOPRIL 40 MG: 20 TABLET ORAL at 09:07

## 2018-07-26 RX ADMIN — HYDROXYZINE HYDROCHLORIDE 25 MG: 25 TABLET, FILM COATED ORAL at 03:07

## 2018-07-26 RX ADMIN — HYDROXYZINE HYDROCHLORIDE 25 MG: 25 TABLET, FILM COATED ORAL at 08:07

## 2018-07-26 RX ADMIN — MEGESTROL ACETATE 200 MG: 40 SUSPENSION ORAL at 01:07

## 2018-07-26 RX ADMIN — HYDROXYZINE HYDROCHLORIDE 25 MG: 25 TABLET, FILM COATED ORAL at 09:07

## 2018-07-26 RX ADMIN — METOPROLOL SUCCINATE 100 MG: 100 TABLET, EXTENDED RELEASE ORAL at 09:07

## 2018-07-26 RX ADMIN — ATORVASTATIN CALCIUM 40 MG: 20 TABLET, FILM COATED ORAL at 09:07

## 2018-07-26 RX ADMIN — PANTOPRAZOLE SODIUM 40 MG: 40 TABLET, DELAYED RELEASE ORAL at 09:07

## 2018-07-26 RX ADMIN — MEGESTROL ACETATE 200 MG: 40 SUSPENSION ORAL at 06:07

## 2018-07-26 RX ADMIN — SEVELAMER CARBONATE 800 MG: 800 TABLET, FILM COATED ORAL at 01:07

## 2018-07-26 RX ADMIN — SEVELAMER CARBONATE 800 MG: 800 TABLET, FILM COATED ORAL at 06:07

## 2018-07-26 NOTE — SUBJECTIVE & OBJECTIVE
Past Medical History:   Diagnosis Date    AAA (abdominal aortic aneurysm) 7/16/2018    CHF (congestive heart failure)     Current smoker 07/16/2018    ESRD on hemodialysis 7/16/2018    Hypertension     Mass of head     Peripheral arterial disease 7/16/2018    Scoliosis     Severe protein-calorie malnutrition 7/17/2018       Past Surgical History:   Procedure Laterality Date    ABDOMINAL AORTIC ANEURYSM REPAIR, ENDOVASCULAR Right 7/16/2018    Procedure: Repair-Aneurysm-Abdominal Aortic-Endovascular (Aaa) 9.2min flouro time .09;  Surgeon: Good Westfall MD;  Location: [facility] OR [address] [place] [address];  Service: Peripheral Vascular;  Laterality: Right;   9.2min flouro time .09    AORTOGRAPHY N/A 7/16/2018    Procedure: AORTOGRAM;  Surgeon: Good Westfall MD;  [facility];  Service: Peripheral Vascular;  Laterality: N/A;    INSERTION OF VENOUS ACCESS PORT Right 7/16/2018    Procedure: INSERTION, VENOUS ACCESS PORT;  Surgeon: Good Westfall MD;  Location: [facility] OR [address] [place] [address];  Service: Peripheral Vascular;  Laterality: Right;  L CFA 5f access    KIDNEY TRANSPLANT      removal of transplanted kidney      TUBAL LIGATION         Review of patient's allergies indicates:  No Known Allergies    No current facility-administered medications on file prior to encounter.      Current Outpatient Prescriptions on File Prior to Encounter   Medication Sig    atorvastatin (LIPITOR) 40 MG tablet Take 1 tablet (40 mg total) by mouth once daily.    HYDROcodone-acetaminophen (NORCO) 5-325 mg per tablet Take 1 tablet by mouth every 4 (four) hours as needed.    pantoprazole (PROTONIX) 40 MG tablet Take 1 tablet (40 mg total) by mouth once daily.    sevelamer carbonate (RENVELA) 800 mg Tab Take 1 tablet (800 mg total) by mouth 3 (three) times daily with meals.    amLODIPine (NORVASC) 5 MG tablet Take 1 tablet (5 mg total) by mouth once daily.    aspirin (ECOTRIN) 81 MG EC tablet Take 1 tablet (81 mg total) by  mouth once daily.     Family History     None        Social History Main Topics    Smoking status: Not on file    Smokeless tobacco: Not on file    Alcohol use Not on file    Drug use: Unknown    Sexual activity: Not on file     Review of Systems   Constitutional: Positive for activity change, appetite change and fatigue. Negative for chills, diaphoresis and fever.   HENT: Negative for sore throat.    Respiratory: Negative for shortness of breath.    Cardiovascular: Negative for chest pain.   Gastrointestinal: Negative for abdominal pain and nausea.   Genitourinary: Negative for dysuria and pelvic pain.   Musculoskeletal: Positive for back pain (lower; not currently) and back pain.   Skin: Negative for rash.   Neurological: Negative for weakness and headaches.   Hematological: Does not bruise/bleed easily.   Psychiatric/Behavioral: Positive for confusion.   Objective:     Vital Signs (Most Recent):  Temp: 97.5 °F (36.4 °C) (07/25/18 1400)  Pulse: 75 (07/25/18 1600)  Resp: 16 (07/25/18 1600)  BP: (!) 138/92 (07/25/18 1600)  SpO2: 100 % (07/25/18 1500) Vital Signs (24h Range):  Temp:  [97.5 °F (36.4 °C)-98.2 °F (36.8 °C)] 97.5 °F (36.4 °C)  Pulse:  [70-82] 75  Resp:  [16-22] 16  SpO2:  [93 %-100 %] 100 %  BP: (128-193)/() 138/92     Weight: 56.7 kg (125 lb)  Body mass index is 20.8 kg/m².    Physical Exam    Constitutional: She is oriented to person, place, and time. No distress. Thin and frail .   HENT:   Head: Atraumatic.   Eyes: EOM are normal.   Neck: Neck supple.   Cardiovascular: Normal rate and regular rhythm.  normal heart sounds and intact distal pulses.   Pulmonary/Chest: Breath sounds normal. No stridor. No respiratory distress. She has no wheezes. She has no rhonchi. She has no rales.   Abdominal: Soft. Bowel sounds are normal. There is no tenderness.   LLQ healed incision from previous renal allograft removal   Musculoskeletal: Normal range of motion. She exhibits no edema.   Neurological: She  is alert and oriented to person, place, and time.   Skin: Skin is warm and dry.   L thigh graft good bruit   Psychiatric: She has a normal mood and affect.      Significant Labs:   CBC:   Recent Labs  Lab 07/25/18  0456   WBC 4.62   HGB 11.2*   HCT 36.5*   *     CMP:   Recent Labs  Lab 07/25/18  0542   *   K 4.3   CL 94*   CO2 24   *   BUN 33*   CREATININE 11.2*   CALCIUM 7.3*   ANIONGAP 16   EGFRNONAA 3.5*     All pertinent labs within the past 24 hours have been reviewed.    Significant Imaging: I have reviewed all pertinent imaging results/findings within the past 24 hours.   Imaging Results          CTA Abdomen and Pelvis (Final result)     Abnormal  Result time 07/25/18 04:20:56   Procedure changed from CTA Chest Abdomen Pelvis     Final result by Curt Nelson MD (07/25/18 04:20:56)                 Impression:      Postoperative change of endovascular aortic aneurysm repair of the infrarenal abdominal aorta with contrast extravasation along the right lateral aspect of the graft into a retroperitoneal hematoma as detailed above.  Findings are worrisome for endoleak, possibly type 3 or 4.  Recommend vascular surgery evaluation and correlation with outside imaging, as dedicated endoleak protocol was not performed.    Additional focus of persistent aortic rupture versus pseudoaneurysm along the right lateral aspect of the infrarenal abdominal aorta appears distal to the graft material.    Extensive iliofemoral vascular calcifications with bilateral femoral bypass grafts.  Right graft is occluded.    IVC filter with stenosis of the IVC just caudal to the filter.  Prominent opacification of the lower IVC and iliac veins suggest the presence of arteriovenous fistula.    Additional stable findings as above.    This report was flagged in Epic as abnormal.    Electronically signed by resident: Juaquin Mcbride  Date:    07/25/2018  Time:    03:03    Electronically signed by: Curt Nelson  MD  Date:    07/25/2018  Time:    04:20             Narrative:    EXAMINATION:  CTA ABDOMEN AND PELVIS    CLINICAL HISTORY:  Abdominal aortic aneurysm (AAA), known, follow up;evaluate for endoleak;    TECHNIQUE:  Using 75 cc of Omnipaque 350 IV contrast, and multi-detector helical CT technique, axial CT angiogram images of the abdomen were obtained from the lung bases through the pelvis. Additionally, precontrast and portal venous phase images of the abdomen and pelvis also performed.  2D post-processing coronal and sagittal reconstructions of the abdominal aorta and visceral arteries performed.    COMPARISON:  CTA abdomen and pelvis 07/16/2018.    FINDINGS:  Heart: No cardiomegaly or pericardial effusion.There is dense calcification of the aortic valve as well as the mitral valve apparatus.  Abundant radiopaque material is present within the coronary arteries.    Lung Bases: Lungs are symmetrically expanded and demonstrate moderate centrilobular emphysema.    Liver: Normal in size and attenuation without focal hepatic abnormality.    Gallbladder: Surgically absent.    Bile Ducts: No intra or extrahepatic biliary ductal dilation.    Pancreas: No pancreatic mass lesion or peripancreatic inflammatory change.    Spleen: Not enlarged..    Adrenals: No focal mass.    Genitourinary: There is severe bilateral renal atrophy, noting inferior displacement of the left kidney, similar in appearance to the prior exam.No renal concentration of contrast is appreciated.  No right hydroureteronephrosis.  Calcified mass in the left lower quadrant adjacent to the iliac vessels may reflect a nonfunctioning renal transplant.  Urinary bladder is relatively decompressed but otherwise unremarkable.    Reproductive organs: Multiple tortuous, dilated venous structures are again present in the expected region of the uterus.  No pelvic mass identified.    GI tract/Mesentery: Stomach is normal.  Visualized loops of small and large bowel are  normal in caliber without evidence for inflammation or obstruction.High-density material is present throughout both small and large bowel, possibly related to outside CT scan.    Peritoneal Space: No abdominopelvic ascites or intraperitoneal free air.    Retroperitoneum: No significant adenopathy.    Abdominal wall: Normal.    Vasculature:    There is fusiform dilation of the suprarenal abdominal aorta measuring up to 3.0 cm in greatest diameter (axial series 4, image 36).  There is interval postoperative change of endovascular aortic aneurysmal repair extending from the level of the superior mesenteric artery to approximately 2.5 cm above the aortic bifurcation.  However, there is a persistent focus of contrast extravasation along the right lateral aspect of the graft at the level of the L3 vertebral body concerning for type 3 or type 4 endoleak.  Contrast extravasates into a retroperitoneal hematoma containing mural thrombus which may represent pseudo aneurysm or contained rupture.  This structure is grossly stable in size from prior examination dated 07/16/2018 measuring 4.9 x 4.6 cm (previously 4.5 x 4.2 cm).  Internal component of active extravasation measures 2.4 cm (previously 2.5 cm).  Additionally, there is a 2nd focus of contrast extravasation along the right lateral aspect of the infrarenal abdominal aorta just caudal to the distal aspect of the aortic graft which is unchanged in size measuring 1.1 x 0.7 cm (previously 1.1 x 1.1 cm).  No new areas of contrast extravasation identified.    There is extensive vascular calcification involving the iliac and femoral arteries bilaterally.  Femoral artery bypass grafts are present bilaterally, left patent and right occluded.  Mesenteric vessels and right renal artery appear patent noting prominent ostial calcifications.    There is an infrarenal IVC filter in place with stenosis of the IVC just caudal to the stent.  Lower IVC and iliac veins demonstrate dense  contrast opacification suggesting arteriovenous fistula.    Bones: Osseous structures demonstrate age-appropriate degenerative change.  No evidence for acute fracture.  Lytic structure within the iliac component of the right sacroiliac joint again noted.

## 2018-07-26 NOTE — PT/OT/SLP EVAL
Physical Therapy Evaluation and Discharge Note    Patient Name:  Tony De Leon   MRN:  [id number]    Recommendations:     Discharge Recommendations:  home   Discharge Equipment Recommendations: none   Barriers to discharge: None    Assessment:     Tony De Leon is a 52 y.o. female admitted with a medical diagnosis of Endoleak of aortic graft. .  At this time, patient is functioning at their prior level of function and does not require further acute PT services. Pt educated on benefits of PT, pt declined further therapy services.     Recent Surgery: * No surgery found *      Plan:     During this hospitalization, patient does not require further acute PT services.  Please re-consult if situation changes.     Plan of Care Reviewed with: patient    Subjective     Communicated with RN prior to session.  Patient found in L side lying upon PT entry to room, agreeable to evaluation.      Chief Complaint: pt did not want to participate 2/2 saying she did not need PT  Patient comments/goals: pt states that she has been walking around with no issues while in the hosptial.   Pain/Comfort:  · Pain Rating 1:  (none rated; pt appeared to be uncomfortable)    Patients cultural, spiritual, Buddhist conflicts given the current situation: none stated    Living Environment:  Pt lives with her daughter in a [facility] with no JAY.   Prior to admission, patients level of function was mod ( I) with RW and w/c PRN .  Patient has the following equipment: none.  DME owned (not currently used): rolling walker and wheelchair.  Upon discharge, patient will have assistance from daughter.    Objective:     Patient found with:  (no active lines)     General Precautions: Standard, fall   Orthopedic Precautions:N/A   Braces: N/A     Exams:  · Cognitive Exam:  Patient is oriented to Person and Place and follows 100% of verbal commands   · Gross Motor Coordination:  WFL  · Sensation:    · -       Intact  · Skin Integrity/Edema:  -       Skin integrity:  Visible skin intact  · RLE ROM: WFL  · RLE Strength: WFL  · LLE ROM: WFL  · LLE Strength: WFL    Functional Mobility:  · Bed Mobility:  Rolling Left:  independence  · Rolling Right: independence  · Scooting: independence  · Supine to Sit: independence  · Sit to Supine: independence  · Transfers:  Sit to Stand:  supervision with no AD   · Pt declined any further mobility     AM-PAC 6 CLICK MOBILITY  Total Score:23       Therapeutic Activities and Exercises:     Patient education  · Patient educated on the role of PT and POC  · Whiteboard updated in patients room to current assistance level  · All of patients questions were answered within the scope of PT  · Patient educated on importance of out of bed activity while in the hosptial per tolerance  · Patient educated on safe transfers with nursing as appropriate      Patient left left sidelying with all lines intact and call button in reach.    GOALS:    Physical Therapy Goals     Not on file          Multidisciplinary Problems (Resolved)        Problem: Physical Therapy Goal    Goal Priority Disciplines Outcome Goal Variances Interventions   Physical Therapy Goal   (Resolved)     PT/OT, PT Outcome(s) achieved                     History:     Past Medical History:   Diagnosis Date    AAA (abdominal aortic aneurysm) 7/16/2018    CHF (congestive heart failure)     Current smoker 07/16/2018    ESRD on hemodialysis 7/16/2018    Hypertension     Mass of head     Peripheral arterial disease 7/16/2018    Scoliosis     Severe protein-calorie malnutrition 7/17/2018       Past Surgical History:   Procedure Laterality Date    ABDOMINAL AORTIC ANEURYSM REPAIR, ENDOVASCULAR Right 7/16/2018    Procedure: Repair-Aneurysm-Abdominal Aortic-Endovascular (Aaa) 9.2min flouro time .09;  Surgeon: Good Westfall MD;  Location: [facility] OR [address] [place] [address];  Service: Peripheral Vascular;  Laterality: Right;   9.2min flouro time .09    AORTOGRAPHY N/A 7/16/2018    Procedure:  AORTOGRAM;  Surgeon: Good Westfall MD;  [facility];  Service: Peripheral Vascular;  Laterality: N/A;    INSERTION OF VENOUS ACCESS PORT Right 7/16/2018    Procedure: INSERTION, VENOUS ACCESS PORT;  Surgeon: Good Westfall MD;  Location: [facility] OR 2ND FLR;  Service: Peripheral Vascular;  Laterality: Right;  L CFA 5f access    KIDNEY TRANSPLANT      removal of transplanted kidney      TUBAL LIGATION         Clinical Decision Making:     History  Co-morbidities and personal factors that may impact the plan of care Examination  Body Structures and Functions, activity limitations and participation restrictions that may impact the plan of care Clinical Presentation   Decision Making/ Complexity Score   Co-morbidities:   [] Time since onset of injury / illness / exacerbation  [] Status of current condition  []Patient's cognitive status and safety concerns    [x] Multiple Medical Problems (see med hx)  Personal Factors:   [] Patient's age  [] Prior Level of function   [] Patient's home situation (environment and family support)  [x] Patient's level of motivation  [x] Expected progression of patient      HISTORY:(criteria)    [] 69459 - no personal factors/history    [x] 68558 - has 1-2 personal factor/comorbidity     [] 71818 - has >3 personal factor/comorbidity     Body Regions:  [] Objective examination findings  [] Head     []  Neck  [x] Trunk   [] Upper Extremity  [] Lower Extremity    Body Systems:  [] For communication ability, affect, cognition, language, and learning style: the assessment of the ability to make needs known, consciousness, orientation (person, place, and time), expected emotional /behavioral responses, and learning preferences (eg, learning barriers, education  needs)  [] For the neuromuscular system: a general assessment of gross coordinated movement (eg, balance, gait, locomotion, transfers, and transitions) and motor function  (motor control and motor learning)  [] For the  musculoskeletal system: the assessment of gross symmetry, gross range of motion, gross strength, height, and weight  [] For the integumentary system: the assessment of pliability(texture), presence of scar formation, skin color, and skin integrity  [x] For cardiovascular/pulmonary system: the assessment of heart rate, respiratory rate, blood pressure, and edema     Activity limitations:    [] Patient's cognitive status and saf ety concerns          [] Status of current condition      [] Weight bearing restriction  [x] Cardiopulmunary Restriction    Participation Restrictions:   [] Goals and goal agreement with the patient     [] Rehab potential (prognosis) and probable outcome      Examination of Body System: (criteria)    [x] 66571 - addressing 1-2 elements    [] 43008 - addressing a total of 3 or more elements     [] 54569 -  Addressing a total of 4 or more elements         Clinical Presentation: (criteria)  Stable - 44871     On examination of body system using standardized tests and measures patient presents with 1-2 elements from any of the following: body structures and functions, activity limitations, and/or participation restrictions.  Leading to a clinical presentation that is considered stable and/or uncomplicated                              Clinical Decision Making  (Eval Complexity):  Low- 08322     Time Tracking:     PT Received On: 07/26/18  PT Start Time: 0953     PT Stop Time: 1003  PT Total Time (min): 10 min     Billable Minutes: Evaluation 10 min      Jeffery Noble, PT  07/26/2018

## 2018-07-26 NOTE — PLAN OF CARE
Problem: Patient Care Overview  Goal: Plan of Care Review  Outcome: Ongoing (interventions implemented as appropriate)   07/26/18 1833   Coping/Psychosocial   Plan Of Care Reviewed With patient       Problem: Coping, Compromised Individual (Adult,Obstetrics,Pediatric)  Goal: Identify Related Risk Factors and Signs and Symptoms  Related risk factors and signs and symptoms are identified upon initiation of Human Response Clinical Practice Guideline (CPG)   Outcome: Ongoing (interventions implemented as appropriate)   07/26/18 1833   Coping, Compromised Individual   Related Risk Factors (Compromised Individual Coping) body image alteration;cognitive impairment;coping skills ineffective;health status change;memory loss;prognosis/treatment plan   Signs and Symptoms (Compromised Individual Coping) coping mechanisms inappropriate;decision-making/problem-solving deficit;eating habits change;frequent illness;inability to meet basic needs

## 2018-07-26 NOTE — PLAN OF CARE
Problem: Physical Therapy Goal  Goal: Physical Therapy Goal  Outcome: Outcome(s) achieved Date Met: 07/26/18  Patient does not require skilled acute PT services at this time.   Jeffery Noble PT, DPT  7/26/2018

## 2018-07-26 NOTE — PLAN OF CARE
Problem: Occupational Therapy Goal  Goal: Occupational Therapy Goal  Outcome: Outcome(s) achieved Date Met: 07/26/18  Pt is at baseline. OT to d/c.    QUIQUE Barros  7/26/2018  Pager: [phone]

## 2018-07-26 NOTE — H&P
[facility] Hospital Medicine  History & Physical    Patient Name: Tony De Leon  MRN: [id number]  Admission Date: 7/25/2018  Attending Physician: Gallo Mattson MD  Primary Care Provider: Primary Doctor [facility] Medicine Team: [facility] HOSP MED A Gallo Mattson MD     Patient information was obtained from patient, past medical records and ER records.     Subjective:     Principal Problem:Endoleak of aortic graft    Chief Complaint:   Chief Complaint   Patient presents with    AAA Dissection Transfer     Patient transfered from [facility] in [place]. Patient brought in by EMS for evaluation of altered mental status and lower back pain. Upon arrival patient's initial CBG was 30. [facility] treated hypoglycemia, patient became more responsive and reported lower back pain. Patient had AAA repair done last week. CT showed aneurysm leaking and enlarging. Patient is also a dialysis patient, M/W/F        HPI: This is a 51 yo F with hypertension, PAD, current 1/2 pack a day smoker, ESRD over 10 yrs sp failed kidney transplant now on HD, and recent admission here for AAA sp EVAR on 7/16 who initially presented to [facility] in [place], [place] for lower back pain not associated with abdominal pain/N/V x 2-3 days in which CT showing aneurysm leak then transferred to [facility] for Vascular Surgery.  evaluation. Per OSH records, patient was found wandering in her yard when EMS arrived. Found to be severely hypoglycemic (20s) and reported to have improvement in her mental status after receiving glucose. However, OSH CT scan was concerning for aneurysmal expansion so she was transferred to [facility] ED for further evaluation. Vascular surgery evaluated, clinically she is asymptomatic from the AAA for now.  She still have flow into the saccular AAA. Vascular surgery team spoke with  twice (in early AM and in PM).  Also, spoke with daughter, Gillian (RN, [phone]).  Family is clear now  that they do not want another surgery or procedure - even if minimally invasive.  Admit to hospital medicine for monitoring and eval for hospice.     She has had subjective weight loss over the year. But since her surgery she has not been doing much, per records, pt has a hx of non-compliance and last dialysis was 7/20. MWF HD. Of note, she has a hx of a prior renal transplant from [id number], which had to be removed secondary to infection, SBP (while on PD), CHF. Surgical hx significant for PD catheter, renal transplant and explant, tubal ligation, IVC filter unknown when placed. Per previous admission notes, she is unable to walk a block or up a flight of stairs.  No known MI/stroke. Also is noncompliant with her medications at home. On norvasc, lisinopril, and labetolol.     Past Medical History:   Diagnosis Date    AAA (abdominal aortic aneurysm) 7/16/2018    CHF (congestive heart failure)     Current smoker 07/16/2018    ESRD on hemodialysis 7/16/2018    Hypertension     Mass of head     Peripheral arterial disease 7/16/2018    Scoliosis     Severe protein-calorie malnutrition 7/17/2018       Past Surgical History:   Procedure Laterality Date    ABDOMINAL AORTIC ANEURYSM REPAIR, ENDOVASCULAR Right 7/16/2018    Procedure: Repair-Aneurysm-Abdominal Aortic-Endovascular (Aaa) 9.2min flouro time .09;  Surgeon: Good Westfall MD;  Location: [facility] OR [address] [place] [address];  Service: Peripheral Vascular;  Laterality: Right;   9.2min flouro time .09    AORTOGRAPHY N/A 7/16/2018    Procedure: AORTOGRAM;  Surgeon: Good Westfall MD;  Location: [facility] OR [address] [place] [address];  Service: Peripheral Vascular;  Laterality: N/A;    INSERTION OF VENOUS ACCESS PORT Right 7/16/2018    Procedure: INSERTION, VENOUS ACCESS PORT;  Surgeon: Good Westfall MD;  Location: [facility] OR [address] [place] [address];  Service: Peripheral Vascular;  Laterality: Right;  L CFA 5f access    KIDNEY TRANSPLANT      removal of transplanted kidney      TUBAL LIGATION          Review of patient's allergies indicates:  No Known Allergies    No current facility-administered medications on file prior to encounter.      Current Outpatient Prescriptions on File Prior to Encounter   Medication Sig    atorvastatin (LIPITOR) 40 MG tablet Take 1 tablet (40 mg total) by mouth once daily.    HYDROcodone-acetaminophen (NORCO) 5-325 mg per tablet Take 1 tablet by mouth every 4 (four) hours as needed.    pantoprazole (PROTONIX) 40 MG tablet Take 1 tablet (40 mg total) by mouth once daily.    sevelamer carbonate (RENVELA) 800 mg Tab Take 1 tablet (800 mg total) by mouth 3 (three) times daily with meals.    amLODIPine (NORVASC) 5 MG tablet Take 1 tablet (5 mg total) by mouth once daily.    aspirin (ECOTRIN) 81 MG EC tablet Take 1 tablet (81 mg total) by mouth once daily.     Family History     None        Social History Main Topics    Smoking status: Not on file    Smokeless tobacco: Not on file    Alcohol use Not on file    Drug use: Unknown    Sexual activity: Not on file     Review of Systems   Constitutional: Positive for activity change, appetite change and fatigue. Negative for chills, diaphoresis and fever.   HENT: Negative for sore throat.    Respiratory: Negative for shortness of breath.    Cardiovascular: Negative for chest pain.   Gastrointestinal: Negative for abdominal pain and nausea.   Genitourinary: Negative for dysuria and pelvic pain.   Musculoskeletal: Positive for back pain (lower; not currently) and back pain.   Skin: Negative for rash.   Neurological: Negative for weakness and headaches.   Hematological: Does not bruise/bleed easily.   Psychiatric/Behavioral: Positive for confusion.   Objective:     Vital Signs (Most Recent):  Temp: 97.5 °F (36.4 °C) (07/25/18 1400)  Pulse: 75 (07/25/18 1600)  Resp: 16 (07/25/18 1600)  BP: (!) 138/92 (07/25/18 1600)  SpO2: 100 % (07/25/18 1500) Vital Signs (24h Range):  Temp:  [97.5 °F (36.4 °C)-98.2 °F (36.8 °C)] 97.5 °F (36.4  °C)  Pulse:  [70-82] 75  Resp:  [16-22] 16  SpO2:  [93 %-100 %] 100 %  BP: (128-193)/() 138/92     Weight: 56.7 kg (125 lb)  Body mass index is 20.8 kg/m².    Physical Exam    Constitutional: She is oriented to person, place, and time. No distress. Thin and frail .   HENT:   Head: Atraumatic.   Eyes: EOM are normal.   Neck: Neck supple.   Cardiovascular: Normal rate and regular rhythm.  normal heart sounds and intact distal pulses.   Pulmonary/Chest: Breath sounds normal. No stridor. No respiratory distress. She has no wheezes. She has no rhonchi. She has no rales.   Abdominal: Soft. Bowel sounds are normal. There is no tenderness.   LLQ healed incision from previous renal allograft removal   Musculoskeletal: Normal range of motion. She exhibits no edema.   Neurological: She is alert and oriented to person, place, and time.   Skin: Skin is warm and dry.   L thigh graft good bruit   Psychiatric: She has a normal mood and affect.      Significant Labs:   CBC:   Recent Labs  Lab 07/25/18  0456   WBC 4.62   HGB 11.2*   HCT 36.5*   *     CMP:   Recent Labs  Lab 07/25/18  0542   *   K 4.3   CL 94*   CO2 24   *   BUN 33*   CREATININE 11.2*   CALCIUM 7.3*   ANIONGAP 16   EGFRNONAA 3.5*     All pertinent labs within the past 24 hours have been reviewed.    Significant Imaging: I have reviewed all pertinent imaging results/findings within the past 24 hours.   Imaging Results          CTA Abdomen and Pelvis (Final result)     Abnormal  Result time 07/25/18 04:20:56   Procedure changed from CTA Chest Abdomen Pelvis     Final result by Curt Nelson MD (07/25/18 04:20:56)                 Impression:      Postoperative change of endovascular aortic aneurysm repair of the infrarenal abdominal aorta with contrast extravasation along the right lateral aspect of the graft into a retroperitoneal hematoma as detailed above.  Findings are worrisome for endoleak, possibly type 3 or 4.  Recommend vascular  surgery evaluation and correlation with outside imaging, as dedicated endoleak protocol was not performed.    Additional focus of persistent aortic rupture versus pseudoaneurysm along the right lateral aspect of the infrarenal abdominal aorta appears distal to the graft material.    Extensive iliofemoral vascular calcifications with bilateral femoral bypass grafts.  Right graft is occluded.    IVC filter with stenosis of the IVC just caudal to the filter.  Prominent opacification of the lower IVC and iliac veins suggest the presence of arteriovenous fistula.    Additional stable findings as above.    This report was flagged in Epic as abnormal.    Electronically signed by resident: Juaquin Mcbride  Date:    07/25/2018  Time:    03:03    Electronically signed by: Curt Nelson MD  Date:    07/25/2018  Time:    04:20             Narrative:    EXAMINATION:  CTA ABDOMEN AND PELVIS    CLINICAL HISTORY:  Abdominal aortic aneurysm (AAA), known, follow up;evaluate for endoleak;    TECHNIQUE:  Using 75 cc of Omnipaque 350 IV contrast, and multi-detector helical CT technique, axial CT angiogram images of the abdomen were obtained from the lung bases through the pelvis. Additionally, precontrast and portal venous phase images of the abdomen and pelvis also performed.  2D post-processing coronal and sagittal reconstructions of the abdominal aorta and visceral arteries performed.    COMPARISON:  CTA abdomen and pelvis 07/16/2018.    FINDINGS:  Heart: No cardiomegaly or pericardial effusion.There is dense calcification of the aortic valve as well as the mitral valve apparatus.  Abundant radiopaque material is present within the coronary arteries.    Lung Bases: Lungs are symmetrically expanded and demonstrate moderate centrilobular emphysema.    Liver: Normal in size and attenuation without focal hepatic abnormality.    Gallbladder: Surgically absent.    Bile Ducts: No intra or extrahepatic biliary ductal dilation.    Pancreas:  No pancreatic mass lesion or peripancreatic inflammatory change.    Spleen: Not enlarged..    Adrenals: No focal mass.    Genitourinary: There is severe bilateral renal atrophy, noting inferior displacement of the left kidney, similar in appearance to the prior exam.No renal concentration of contrast is appreciated.  No right hydroureteronephrosis.  Calcified mass in the left lower quadrant adjacent to the iliac vessels may reflect a nonfunctioning renal transplant.  Urinary bladder is relatively decompressed but otherwise unremarkable.    Reproductive organs: Multiple tortuous, dilated venous structures are again present in the expected region of the uterus.  No pelvic mass identified.    GI tract/Mesentery: Stomach is normal.  Visualized loops of small and large bowel are normal in caliber without evidence for inflammation or obstruction.High-density material is present throughout both small and large bowel, possibly related to outside CT scan.    Peritoneal Space: No abdominopelvic ascites or intraperitoneal free air.    Retroperitoneum: No significant adenopathy.    Abdominal wall: Normal.    Vasculature:    There is fusiform dilation of the suprarenal abdominal aorta measuring up to 3.0 cm in greatest diameter (axial series 4, image 36).  There is interval postoperative change of endovascular aortic aneurysmal repair extending from the level of the superior mesenteric artery to approximately 2.5 cm above the aortic bifurcation.  However, there is a persistent focus of contrast extravasation along the right lateral aspect of the graft at the level of the L3 vertebral body concerning for type 3 or type 4 endoleak.  Contrast extravasates into a retroperitoneal hematoma containing mural thrombus which may represent pseudo aneurysm or contained rupture.  This structure is grossly stable in size from prior examination dated 07/16/2018 measuring 4.9 x 4.6 cm (previously 4.5 x 4.2 cm).  Internal component of active  extravasation measures 2.4 cm (previously 2.5 cm).  Additionally, there is a 2nd focus of contrast extravasation along the right lateral aspect of the infrarenal abdominal aorta just caudal to the distal aspect of the aortic graft which is unchanged in size measuring 1.1 x 0.7 cm (previously 1.1 x 1.1 cm).  No new areas of contrast extravasation identified.    There is extensive vascular calcification involving the iliac and femoral arteries bilaterally.  Femoral artery bypass grafts are present bilaterally, left patent and right occluded.  Mesenteric vessels and right renal artery appear patent noting prominent ostial calcifications.    There is an infrarenal IVC filter in place with stenosis of the IVC just caudal to the stent.  Lower IVC and iliac veins demonstrate dense contrast opacification suggesting arteriovenous fistula.    Bones: Osseous structures demonstrate age-appropriate degenerative change.  No evidence for acute fracture.  Lytic structure within the iliac component of the right sacroiliac joint again noted.                                  Assessment/Plan:     * Endoleak of aortic graft    - hx of AAA s/p EVAR 7/16/18 readmitted with AMS and found to have endoleak. Unfortunately, due to her multiple medical co-morbidities she is not a candidate for open surgical repair and due to her anatomy is not a candidate for endovascular intervention. Discussed with Dr Westfall, she is asymptomatic from the AAA for now.  She does still have flow into the saccular AAA; this may or may not be able to be treated with a longer aortic cuff (70mm). He spoke with  twice and spoke with daughter, Gillian (RN, [phone]).    - Family is clear now that they do not want another surgery or procedure - even if minimally invasive. They want hospice care once she goes home.  - Family still want HD  - Will discuss case with palliative care to see if pt is able to continue HD  - Will plan to D/C with hospice when  appropriate   - Will need to discuss code status tomorow        Lytic bone lesion of hip    - CTA Abd: X2 noted. There is a predominantly lytic focus at the anterior right sacroiliac joint which is well circumscribed and demonstrates marginal sclerosis  - No hypercalcemia  - unclear if back pain is due to this  - Pt does not produce Urine, checking SPEP but does not expect hematological as the cause of her FTT and weakness            Adult failure to thrive    Moderate protein-calorie malnutrition  - Chronic per family report, pt report ~ 1 year of weight loss, weakness  - pt with hypoglycemia, lethargy, decreased po intake worsening since her surgery  - No sign of infection, no sign of Major bleeding as Hgb is stable but anemia can play a role in her FFT  - Checking TSH, cortisol, PTH  - PT/OT consulted  - CTA abd 2x has not shown any oncological masses/ hip lytic lesion noted (see below)  - suspect will need to liberalized diet  - nutrition consult  - start pt on Megace trial              Essential hypertension    - continue home medications and monitor          ESRD (end stage renal disease)    - ESRD over 10 yrs sp failed kidney transplant after 5 years then 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 Previously onperitoneal dialysis but transition to iHD due to peritonitis.    - HD MWF,  Last HD Monday 7/23. No residual renal function.  - HD per nephro  - Renal diet   - Sevelamer WM        Hypoglycemia    - present on last admission as well  - Due to poor oral intake  - No hx of DM, checking Cpeptide, insulin level, Cortisol level, TSH ordered  - Q4hr  POCT glucose, encourage intake, due to ESRD holding off D5 gtt for now, can temp start D10 gtt if persistently low          Peripheral arterial disease    - hold asa for now given concern for bleeding            VTE Risk Mitigation         Ordered     Place sequential compression device  Until discontinued      07/25/18 1240     IP VTE HIGH RISK PATIENT  Once       07/25/18 1240             Gallo Mattson MD  Department of Hospital Medicine   [facility]

## 2018-07-26 NOTE — ASSESSMENT & PLAN NOTE
Moderate protein-calorie malnutrition  - Chronic per family report, pt report ~ 1 year of weight loss, weakness  - pt with hypoglycemia, lethargy, decreased po intake worsening since her surgery  - No sign of infection, no sign of Major bleeding as Hgb is stable but anemia can play a role in her FFT  - Checking TSH, cortisol, PTH  - PT/OT consulted  - CTA abd 2x has not shown any oncological masses/ hip lytic lesion noted (see below)  - suspect will need to liberalized diet  - nutrition consult  - start pt on Megace trial

## 2018-07-26 NOTE — NURSING
Pt received snacks x2 for blood sugar of 53 and 60. Repeated . Encouraged to eat supper meal and explained to pt she has to try and eat more to keep BS from going too low. She verbalized understanding.

## 2018-07-26 NOTE — HPI
This is a 51 yo F with hypertension, PAD, current 1/2 pack a day smoker, ESRD over 10 yrs sp failed kidney transplant now on HD, and recent admission here for AAA sp EVAR on 7/16 who initially presented to [facility] in [place], [place] for lower back pain not associated with abdominal pain/N/V x 2-3 days in which CT showing aneurysm leak then transferred to [facility] for Vascular Surgery.  evaluation. Per OSH records, patient was found wandering in her yard when EMS arrived. Found to be severely hypoglycemic (20s) and reported to have improvement in her mental status after receiving glucose. However, OSH CT scan was concerning for aneurysmal expansion so she was transferred to [facility] ED for further evaluation. Vascular surgery evaluated, clinically she is asymptomatic from the AAA for now.  She still have flow into the saccular AAA. Vascular surgery team spoke with  twice (in early AM and in PM).  Also, spoke with daughter, Gillian (RN, [phone]).  Family is clear now that they do not want another surgery or procedure - even if minimally invasive.  Admit to hospital medicine for monitoring and eval for hospice.     She has had subjective weight loss over the year. But since her surgery she has not been doing much, per records, pt has a hx of non-compliance and last dialysis was 7/20. MWF HD. Of note, she has a hx of a prior renal transplant from [id number], which had to be removed secondary to infection, SBP (while on PD), CHF. Surgical hx significant for PD catheter, renal transplant and explant, tubal ligation, IVC filter unknown when placed. Per previous admission notes, she is unable to walk a block or up a flight of stairs.  No known MI/stroke. Also is noncompliant with her medications at home. On norvasc, lisinopril, and labetolol.

## 2018-07-26 NOTE — ASSESSMENT & PLAN NOTE
- hx of AAA s/p EVAR 7/16/18 readmitted with AMS and found to have endoleak. Unfortunately, due to her multiple medical co-morbidities she is not a candidate for open surgical repair and due to her anatomy is not a candidate for endovascular intervention. Discussed with Dr Westfall, she is asymptomatic from the AAA for now.  She does still have flow into the saccular AAA; this may or may not be able to be treated with a longer aortic cuff (70mm). He spoke with  twice and spoke with daughter, Gillian (RN, [phone]).    - Family is clear now that they do not want another surgery or procedure - even if minimally invasive. They want hospice care once she goes home.  - Family still want HD  - Will discuss case with palliative care to see if pt is able to continue HD  - Will plan to D/C with hospice when appropriate   - Will need to discuss code status pillo

## 2018-07-26 NOTE — CONSULTS
[facility]  Palliative Medicine  Consult Note    Patient Name: Tony De Leon  MRN: [id number]  Admission Date: 7/25/2018  Hospital Length of Stay: 1 days  Code Status: Full Code   Attending Provider: Daniel Mattson MD  Consulting Provider: LANA Simpson  Primary Care Physician: Primary Doctor No  Principal Problem:Endoleak of aortic graft    Patient information was obtained from past medical records and primary team .      Inpatient consult to Palliative Care  Consult performed by: REGI COBURN  Consult ordered by: DANIEL MATTSON  Reason for consult: goals of care   Assessment/Recommendations: Palliative care consult received.  Chart reviewed and patient discussed with Dr. Mattson.  Focus of consult to develop goals of care.  Patient transferred from outside hospital with endoleak of aortic graft.  As per Dr. Mattson vascular surgery team discussed potential interventions on more than one occasion and patient/family have declined.  Patient/family inquiring about hospice with continued HD.       Palliative care APRN at bedside.  Mrs. De Leon slow to respond to questions and appears reluctant to speak with palliative care at this time. During this  Short conversation Mrs. De Leon indicates her goal is to continue with HD. Patient did not not respond to questions regarding a transition to comfort and less aggressive care. .   Permission obtained to talk with family understanding.  Palliative care attempted to speak with  Saul De Leon [phone] cell, unable to leave message as mailbox is full.  Attempted to call daughter Kassidy Luque [phone], unable to leave message - no mailbox set up.    Dr. Mattson aware.  Palliative care recommends discharge to home with home health.      Palliative care will sign off.       Thank you for opportunity to participate in Mrs. De Leon's care. Please re-consult as needed.   Regi Coburn, APRN, ACNS-BC, OCN

## 2018-07-26 NOTE — PT/OT/SLP EVAL
Occupational Therapy   Evaluation and Discharge Note    Name: Tony De Leon  MRN: [id number]  Admitting Diagnosis:  Endoleak of aortic graft      Recommendations:     Discharge Recommendations: home  Discharge Equipment Recommendations:  none  Barriers to discharge:  None    History:     Occupational Profile:  Living Environment: Pt lives with daughter in a house with no JAY  Previous level of function: (I); has W/C and RW  Equipment Owned:  none  Assistance upon Discharge: available as needed    Past Medical History:   Diagnosis Date    AAA (abdominal aortic aneurysm) 7/16/2018    CHF (congestive heart failure)     Current smoker 07/16/2018    ESRD on hemodialysis 7/16/2018    Hypertension     Mass of head     Peripheral arterial disease 7/16/2018    Scoliosis     Severe protein-calorie malnutrition 7/17/2018       Past Surgical History:   Procedure Laterality Date    ABDOMINAL AORTIC ANEURYSM REPAIR, ENDOVASCULAR Right 7/16/2018    Procedure: Repair-Aneurysm-Abdominal Aortic-Endovascular (Aaa) 9.2min flouro time .09;  Surgeon: Good Westfall MD;  Location: [facility] OR [address] [place] [address];  Service: Peripheral Vascular;  Laterality: Right;   9.2min flouro time .09    AORTOGRAPHY N/A 7/16/2018    Procedure: AORTOGRAM;  Surgeon: Good Westfall MD;  Location: [facility] OR [address] [place] [address];  Service: Peripheral Vascular;  Laterality: N/A;    INSERTION OF VENOUS ACCESS PORT Right 7/16/2018    Procedure: INSERTION, VENOUS ACCESS PORT;  Surgeon: Good Westfall MD;  Location: [facility] OR [address] [place] [address];  Service: Peripheral Vascular;  Laterality: Right;  L CFA 5f access    KIDNEY TRANSPLANT      removal of transplanted kidney      TUBAL LIGATION         Subjective     Chief Complaint: tired  Patient/Family stated goals: get better and go home  Communicated with: RN prior to session.  Pain/Comfort:  · Pain Rating 1:  (did not rate or complain, but appeared in discomfort)    Patients cultural, spiritual, Yarsani conflicts given  the current situation: none stated    Objective:     Patient found with:  (lines intact)    General Precautions: Standard, fall   Orthopedic Precautions:N/A   Braces: N/A     Occupational Performance:    Bed Mobility:    · (I)    Functional Mobility/Transfers:  · Patient completed Sit <> Stand Transfer with supervision  with  no assistive device   · Patient completed Bed <> Chair Transfer using Stand Pivot technique with supervision with no assistive device  · Functional Mobility: did not observe, but pt states she used the bathroom, ~30 feet of total walking, with no reports of issues with toilet t/f    Activities of Daily Living:  · Lower Body Dressing: independence pants, socks; per pt report  · Toileting: independence per pt report    Cognitive/Visual Perceptual:  Cognitive/Psychosocial Skills:     -       Oriented to: Person, Place, Time and Situation   -       Follows Commands/attention:Follows multistep  commands  -       Communication: clear/fluent  -       Memory: No Deficits noted  -       Safety awareness/insight to disability: intact   -       Mood/Affect/Coping skills/emotional control: Appropriate to situation  Visual/Perceptual:      -Intact    Physical Exam:  Balance:    -       Fair  Postural examination/scapula alignment:    -       No postural abnormalities identified  Dominant hand:    -       R  Upper Extremity Range of Motion:     -       Right Upper Extremity: WFL  -       Left Upper Extremity: WFL  Upper Extremity Strength:    -       Right Upper Extremity: 3/5  -       Left Upper Extremity: 3/5   Strength:    -       Right Upper Extremity: WFL  -       Left Upper Extremity: WFL  Fine Motor Coordination:    -       Intact  Gross motor coordination:   WFL    Patient left left sidelying with all lines intact and call button in reach    AMPAC 6 Click:  AMPAC Total Score: 23    Treatment & Education:  Pt ed re OT role and POC. Pt ed re importance of OOB activity and sitting up for meals. Pt  "ed re safety in ambulation and bathroom ADLs.  Education:    Assessment:     Tony De Leon is a 52 y.o. female with a medical diagnosis of Endoleak of aortic graft. At this time, patient is functioning at their prior level of function and does not require further acute OT services.     Clinical Decision Makin.  OT Low:  "Pt evaluation falls under low complexity for evaluation coding due to performance deficits noted in 1-3 areas as stated above and 0 co-morbities affecting current functional status. Data obtained from problem focused assessments. No modifications or assistance was required for completion of evaluation. Only brief occupational profile and history review completed."     Plan:     During this hospitalization, patient does not require further acute OT services.  Please re-consult if situation changes.    · Plan of Care Reviewed with: patient    This Plan of care has been discussed with the patient who was involved in its development and understands and is in agreement with the identified goals and treatment plan    GOALS:    Occupational Therapy Goals     Not on file          Multidisciplinary Problems (Resolved)        Problem: Occupational Therapy Goal    Goal Priority Disciplines Outcome Interventions   Occupational Therapy Goal   (Resolved)     OT, PT/OT Outcome(s) achieved                    Time Tracking:     OT Date of Treatment: 18  OT Start Time: 953  OT Stop Time: 1003  OT Total Time (min): 10 min    Billable Minutes:Evaluation 10 minutes    QUIQUE Barros  2018  Pager: [phone]    "

## 2018-07-26 NOTE — PLAN OF CARE
07/26/18 0909   Discharge Assessment   Assessment Type Discharge Planning Assessment   Confirmed/corrected address and phone number on facesheet? Yes   Assessment information obtained from? Patient   Expected Length of Stay (days) 3   Communicated expected length of stay with patient/caregiver yes   Prior to hospitilization cognitive status: Alert/Oriented   Prior to hospitalization functional status: Independent   Current cognitive status: Alert/Oriented   Current Functional Status: Independent   Lives With child(yuliana), adult   Able to Return to Prior Arrangements yes   Is patient able to care for self after discharge? Yes   Who are your caregiver(s) and their phone number(s)? Kassidy Luque  [phone]     Patient's perception of discharge disposition home or selfcare   Readmission Within The Last 30 Days no previous admission in last 30 days   Patient currently being followed by outpatient case management? No   Patient currently receives any other outside agency services? No   Equipment Currently Used at Home none   Do you have any problems affording any of your prescribed medications? TBD   Is the patient taking medications as prescribed? yes   Does the patient have transportation home? Yes   Transportation Available family or friend will provide   Does the patient receive services at the [facility]? No   Discharge Plan A Home with family;Home Health   Discharge Plan B Other   Patient/Family In Agreement With Plan yes

## 2018-07-26 NOTE — PLAN OF CARE
Pending palliative care consult.  [phone]

## 2018-07-26 NOTE — TELEPHONE ENCOUNTER
----- Message from Josefina Eagle sent at 7/26/2018 11:49 AM CDT -----  Contact: Juni/ Ryley   Caller stated he need to speak with nurse regarding surgery info.       Please call [phone] ext [phone] regarding this

## 2018-07-26 NOTE — PROGRESS NOTES
IHD complete, blood returned. Pt alert & stable. Hemostasis < 15 minutes, dry sterile dressing applied with bandaids. Blood glucose 173 mg/dl post IHD. Duration 3.0hrs, Qb 400 ml/min, Qd 800 ml/min, UFG 0.0L. Pt transported via stretcher to room 958B.

## 2018-07-26 NOTE — ASSESSMENT & PLAN NOTE
- CTA Abd: X2 noted. There is a predominantly lytic focus at the anterior right sacroiliac joint which is well circumscribed and demonstrates marginal sclerosis  - No hypercalcemia  - unclear if back pain is due to this  - Pt does not produce Urine, checking SPEP but does not expect hematological as the cause of her FTT and weakness

## 2018-07-26 NOTE — ASSESSMENT & PLAN NOTE
- present on last admission as well  - Due to poor oral intake  - No hx of DM, checking Cpeptide, insulin level, Cortisol level, TSH ordered  - Q4hr  POCT glucose, encourage intake, due to ESRD holding off D5 gtt for now, can temp start D10 gtt if persistently low

## 2018-07-27 LAB
ALBUMIN SERPL BCP-MCNC: 2.4 G/DL
ANION GAP SERPL CALC-SCNC: 10 MMOL/L
BASOPHILS # BLD AUTO: 0.03 K/UL
BASOPHILS NFR BLD: 0.8 %
BUN SERPL-MCNC: 14 MG/DL
CALCIUM SERPL-MCNC: 7.8 MG/DL
CHLORIDE SERPL-SCNC: 101 MMOL/L
CO2 SERPL-SCNC: 23 MMOL/L
CREAT SERPL-MCNC: 5.6 MG/DL
DIFFERENTIAL METHOD: ABNORMAL
EOSINOPHIL # BLD AUTO: 0.3 K/UL
EOSINOPHIL NFR BLD: 7.4 %
ERYTHROCYTE [DISTWIDTH] IN BLOOD BY AUTOMATED COUNT: 17.2 %
EST. GFR  (AFRICAN AMERICAN): 9.2 ML/MIN/1.73 M^2
EST. GFR  (NON AFRICAN AMERICAN): 8 ML/MIN/1.73 M^2
GLUCOSE SERPL-MCNC: 80 MG/DL
HCT VFR BLD AUTO: 28.2 %
HGB BLD-MCNC: 9 G/DL
IMM GRANULOCYTES # BLD AUTO: 0.01 K/UL
IMM GRANULOCYTES NFR BLD AUTO: 0.3 %
INTERPRETATION SERPL IFE-IMP: NORMAL
LYMPHOCYTES # BLD AUTO: 0.8 K/UL
LYMPHOCYTES NFR BLD: 21.9 %
MCH RBC QN AUTO: 28.2 PG
MCHC RBC AUTO-ENTMCNC: 31.9 G/DL
MCV RBC AUTO: 88 FL
MONOCYTES # BLD AUTO: 0.3 K/UL
MONOCYTES NFR BLD: 9 %
NEUTROPHILS # BLD AUTO: 2.3 K/UL
NEUTROPHILS NFR BLD: 60.6 %
NRBC BLD-RTO: 0 /100 WBC
PATHOLOGIST INTERPRETATION IFE: NORMAL
PATHOLOGIST INTERPRETATION SPE: NORMAL
PHOSPHATE SERPL-MCNC: 2.9 MG/DL
PLATELET # BLD AUTO: 120 K/UL
PMV BLD AUTO: 10 FL
POCT GLUCOSE: 56 MG/DL (ref 70–110)
POCT GLUCOSE: 61 MG/DL (ref 70–110)
POCT GLUCOSE: 61 MG/DL (ref 70–110)
POCT GLUCOSE: 64 MG/DL (ref 70–110)
POCT GLUCOSE: 76 MG/DL (ref 70–110)
POCT GLUCOSE: 77 MG/DL (ref 70–110)
POCT GLUCOSE: 88 MG/DL (ref 70–110)
POTASSIUM SERPL-SCNC: 4.1 MMOL/L
RBC # BLD AUTO: 3.19 M/UL
SODIUM SERPL-SCNC: 134 MMOL/L
WBC # BLD AUTO: 3.79 K/UL

## 2018-07-27 PROCEDURE — 11000001 HC ACUTE MED/SURG PRIVATE ROOM

## 2018-07-27 PROCEDURE — 25000003 PHARM REV CODE 250: Performed by: HOSPITALIST

## 2018-07-27 PROCEDURE — 80069 RENAL FUNCTION PANEL: CPT

## 2018-07-27 PROCEDURE — S0179 MEGESTROL 20 MG: HCPCS | Performed by: HOSPITALIST

## 2018-07-27 PROCEDURE — 36415 COLL VENOUS BLD VENIPUNCTURE: CPT

## 2018-07-27 PROCEDURE — 99233 SBSQ HOSP IP/OBS HIGH 50: CPT | Mod: ,,, | Performed by: HOSPITALIST

## 2018-07-27 PROCEDURE — 85025 COMPLETE CBC W/AUTO DIFF WBC: CPT

## 2018-07-27 PROCEDURE — 90935 HEMODIALYSIS ONE EVALUATION: CPT

## 2018-07-27 PROCEDURE — 87040 BLOOD CULTURE FOR BACTERIA: CPT

## 2018-07-27 PROCEDURE — 25000003 PHARM REV CODE 250: Performed by: NURSE PRACTITIONER

## 2018-07-27 PROCEDURE — 90935 HEMODIALYSIS ONE EVALUATION: CPT | Mod: ,,, | Performed by: NURSE PRACTITIONER

## 2018-07-27 RX ORDER — HYDRALAZINE HYDROCHLORIDE 50 MG/1
50 TABLET, FILM COATED ORAL EVERY 8 HOURS
Status: DISCONTINUED | OUTPATIENT
Start: 2018-07-27 | End: 2018-07-30 | Stop reason: HOSPADM

## 2018-07-27 RX ORDER — SODIUM CHLORIDE 9 MG/ML
INJECTION, SOLUTION INTRAVENOUS ONCE
Status: COMPLETED | OUTPATIENT
Start: 2018-07-27 | End: 2018-07-27

## 2018-07-27 RX ORDER — SODIUM CHLORIDE 9 MG/ML
INJECTION, SOLUTION INTRAVENOUS
Status: DISCONTINUED | OUTPATIENT
Start: 2018-07-27 | End: 2018-07-29

## 2018-07-27 RX ADMIN — MEGESTROL ACETATE 200 MG: 40 SUSPENSION ORAL at 06:07

## 2018-07-27 RX ADMIN — LISINOPRIL 40 MG: 20 TABLET ORAL at 01:07

## 2018-07-27 RX ADMIN — SENNOSIDES AND DOCUSATE SODIUM 1 TABLET: 8.6; 5 TABLET ORAL at 01:07

## 2018-07-27 RX ADMIN — MEGESTROL ACETATE 200 MG: 40 SUSPENSION ORAL at 01:07

## 2018-07-27 RX ADMIN — SEVELAMER CARBONATE 800 MG: 800 TABLET, FILM COATED ORAL at 06:07

## 2018-07-27 RX ADMIN — ATORVASTATIN CALCIUM 40 MG: 20 TABLET, FILM COATED ORAL at 01:07

## 2018-07-27 RX ADMIN — METOPROLOL SUCCINATE 100 MG: 100 TABLET, EXTENDED RELEASE ORAL at 01:07

## 2018-07-27 RX ADMIN — HYDROXYZINE HYDROCHLORIDE 25 MG: 25 TABLET, FILM COATED ORAL at 01:07

## 2018-07-27 RX ADMIN — AMLODIPINE BESYLATE 5 MG: 5 TABLET ORAL at 01:07

## 2018-07-27 RX ADMIN — HYDROCODONE BITARTRATE AND ACETAMINOPHEN 1 TABLET: 5; 325 TABLET ORAL at 01:07

## 2018-07-27 RX ADMIN — PANTOPRAZOLE SODIUM 40 MG: 40 TABLET, DELAYED RELEASE ORAL at 01:07

## 2018-07-27 RX ADMIN — SODIUM CHLORIDE: 9 INJECTION, SOLUTION INTRAVENOUS at 08:07

## 2018-07-27 RX ADMIN — SEVELAMER CARBONATE 800 MG: 800 TABLET, FILM COATED ORAL at 01:07

## 2018-07-27 NOTE — PLAN OF CARE
Susanna Gomes (daughter)  [phone]    Notified by nurse that this is the new . Will notify dr fierro.      Cm updated pt's daughter that pt declines hospice and wants to continue with hd.  Dr fierro to call either late today or tomorrow.

## 2018-07-27 NOTE — ASSESSMENT & PLAN NOTE
- hx of AAA s/p EVAR 7/16/18 readmitted with AMS and found to have endoleak. Unfortunately, due to her multiple medical co-morbidities she is not a candidate for open surgical repair and due to her anatomy is not a candidate for endovascular intervention. Discussed with Dr Westfall, she is asymptomatic from the AAA for now.  She does still have flow into the saccular AAA; this may or may not be able to be treated with a longer aortic cuff (70mm). He spoke with  twice and spoke with daughter, Gillian (RN, [phone]).    - Family is clear now that they do not want another surgery or procedure - even if minimally invasive. They want hospice care once she goes home.  - Family still want HD  - Discussed case with palliative care, they were unable to talk to family, unclear if able to get HD while on hospice  - Unable to discuss code status  - abd pain is better now, ctm  - cbc daily

## 2018-07-27 NOTE — SUBJECTIVE & OBJECTIVE
Interval History: Denies abdominal pain this afternoon. CM and palliative care were unable to reach family. Pt request to go home. Report good appetite. PT/OT eval    Review of Systems   Constitutional: Positive for activity change,  fatigue. Negative for chills, diaphoresis and fever.   HENT: Negative for sore throat.    Respiratory: Negative for shortness of breath.    Cardiovascular: Negative for chest pain.   Gastrointestinal: Negative for abdominal pain and nausea.   Genitourinary: Negative for dysuria and pelvic pain.   Musculoskeletal: Positive for back pain (lower; not currently)   Skin: Negative for rash.   Neurological: Negative for weakness and headaches.   Hematological: Does not bruise/bleed easily.      Objective:     Vital Signs (Most Recent):  Temp: 96 °F (35.6 °C) (07/26/18 1958)  Pulse: 72 (07/26/18 2000)  Resp: 16 (07/26/18 1958)  BP: (!) 130/91 (07/26/18 1958)  SpO2: 97 % (07/26/18 1958) Vital Signs (24h Range):  Temp:  [96 °F (35.6 °C)-98.2 °F (36.8 °C)] 96 °F (35.6 °C)  Pulse:  [63-88] 72  Resp:  [16-17] 16  SpO2:  [90 %-97 %] 97 %  BP: (127-152)/(74-94) 130/91     Weight: 45.8 kg (100 lb 15.5 oz)  Body mass index is 16.8 kg/m².    Intake/Output Summary (Last 24 hours) at 07/26/18 2049  Last data filed at 07/26/18 1800   Gross per 24 hour   Intake              840 ml   Output                0 ml   Net              840 ml      Physical Exam  Constitutional: She is oriented to person, place, and time. No distress. Thin and frail .   Cardiovascular: Normal rate and regular rhythm.  normal heart sounds and intact distal pulses.   Pulmonary/Chest: Breath sounds normal. No stridor. No respiratory distress. She has no wheezes. She has no rhonchi. She has no rales.   Abdominal: Soft. Bowel sounds are normal. There is no tenderness.   LLQ healed incision from previous renal allograft removal   Musculoskeletal: Normal range of motion. She exhibits no edema.   Neurological: She is alert and oriented to  person, place, and time.   Skin: Skin is warm and dry.   L thigh graft good bruit   Psychiatric: She has a normal mood and affect.     MELD-Na score: 21 at 7/26/2018  3:49 AM  MELD score: 20 at 7/26/2018  3:49 AM  Calculated from:  Serum Creatinine: 5.9 mg/dL (Rounded to 4) at 7/26/2018  3:49 AM  Serum Sodium: 136 mmol/L at 7/26/2018  3:49 AM  Total Bilirubin: 0.6 mg/dL (Rounded to 1) at 7/26/2018  3:49 AM  INR(ratio): 1 at 7/26/2018  3:49 AM  Age: 52 years    Significant Labs:  CBC:    Recent Labs  Lab 07/25/18  0456 07/26/18  0349 07/26/18  1621   WBC 4.62 5.82 5.15   HGB 11.2* 10.1* 10.0*   HCT 36.5* 33.0* 33.4*   * 114* 106*     CMP:    Recent Labs  Lab 07/25/18  0542 07/26/18  0349   * 136   K 4.3 4.0   CL 94* 100   CO2 24 25   * 68*   BUN 33* 12   CREATININE 11.2* 5.9*   CALCIUM 7.3* 8.8   PROT  --  7.5   ALBUMIN  --  2.5*   BILITOT  --  0.6   ALKPHOS  --  105   AST  --  16   ALT  --  <5*   ANIONGAP 16 11   EGFRNONAA 3.5* 7.5*     PTINR:    Recent Labs  Lab 07/25/18  0456 07/26/18  0349   INR 1.1 1.0       Significant Procedures:   Dobutamine Stress Test with Color Flow: No results found for this or any previous visit.    None

## 2018-07-27 NOTE — PLAN OF CARE
"Problem: Patient Care Overview  Goal: Plan of Care Review  Outcome: Ongoing (interventions implemented as appropriate)  POC reviewed,occasional periods of confusion,walked out in [place] "have you seen my cigarettes?"also  threw tele box in the garbage ,redirected,hypothermia reported to  Mike Finley NP,orders received,pt on HD is anuric, wandy flynn ordered,reinstructed on fall precautions.ctm.      "

## 2018-07-27 NOTE — NURSING
Dr. Mattson present on floor informed him that pt continues to take off her telemetry box and is putting it in the trash can and refusing to wear it even after explaining to her the purpose for needing her heart to be monitor. He stated ok to dc telemetry. Checked pt's bs attempted to give her the glucose tabs pt refused x 3 attempts. Snack given for BS of 61 will recheck.

## 2018-07-27 NOTE — ASSESSMENT & PLAN NOTE
- hx of AAA s/p EVAR 7/16/18 readmitted with AMS and found to have endoleak. Unfortunately, due to her multiple medical co-morbidities she is not a candidate for open surgical repair and due to her anatomy is not a candidate for endovascular intervention. Discussed with Dr Westfall, she is asymptomatic from the AAA for now.  She does still have flow into the saccular AAA; this may or may not be able to be treated with a longer aortic cuff (70mm). He spoke with  twice and spoke with daughter, Gillian (RN, [phone]).    - Family is clear now that they do not want another surgery or procedure - even if minimally invasive. They want hospice care once she goes home.  - Family still want HD  - Discussed case with palliative care, they were unable to talk to family, unclear if able to get HD while on hospice  - Unable to discuss code status  - abd pain is off and on  - cbc daily  - concern for wanting HD in pt that is slowly bleeding. Will need to address family once again but unable to reach them

## 2018-07-27 NOTE — PROGRESS NOTES
[facility] Hospital Medicine  Progress Note    Patient Name: Tony De Leon  MRN: [id number]  Patient Class: IP- Inpatient   Admission Date: 7/25/2018  Length of Stay: 1 days  Attending Physician: Gallo Mattson MD  Primary Care Provider: [facility] Medicine Team: [facility] HOSP MED A Gallo Mattson MD    Subjective:     Principal Problem:Endoleak of aortic graft    HPI:  This is a 51 yo F with hypertension, PAD, current 1/2 pack a day smoker, ESRD over 10 yrs sp failed kidney transplant now on HD, and recent admission here for AAA sp EVAR on 7/16 who initially presented to [facility] in [place], [place] for lower back pain not associated with abdominal pain/N/V x 2-3 days in which CT showing aneurysm leak then transferred to [facility] for Vascular Surgery.  evaluation. Per OSH records, patient was found wandering in her yard when EMS arrived. Found to be severely hypoglycemic (20s) and reported to have improvement in her mental status after receiving glucose. However, OSH CT scan was concerning for aneurysmal expansion so she was transferred to [facility] ED for further evaluation. Vascular surgery evaluated, clinically she is asymptomatic from the AAA for now.  She still have flow into the saccular AAA. Vascular surgery team spoke with  twice (in early AM and in PM).  Also, spoke with daughter, Gillian (RN, [phone]).  Family is clear now that they do not want another surgery or procedure - even if minimally invasive.  Admit to hospital medicine for monitoring and eval for hospice.     She has had subjective weight loss over the year. But since her surgery she has not been doing much, per records, pt has a hx of non-compliance and last dialysis was 7/20. MWF HD. Of note, she has a hx of a prior renal transplant from [id number], which had to be removed secondary to infection, SBP (while on PD), CHF. Surgical hx significant for PD catheter, renal transplant and explant,  tubal ligation, IVC filter unknown when placed. Per previous admission notes, she is unable to walk a block or up a flight of stairs.  No known MI/stroke. Also is noncompliant with her medications at home. On norvasc, lisinopril, and labetolol.     Hospital Course:  No notes on file    Interval History: Denies abdominal pain this afternoon. CM and palliative care were unable to reach family. Pt request to go home. Report good appetite. PT/OT eval    Review of Systems   Constitutional: Positive for activity change,  fatigue. Negative for chills, diaphoresis and fever.   HENT: Negative for sore throat.    Respiratory: Negative for shortness of breath.    Cardiovascular: Negative for chest pain.   Gastrointestinal: Negative for abdominal pain and nausea.   Genitourinary: Negative for dysuria and pelvic pain.   Musculoskeletal: Positive for back pain (lower; not currently)   Skin: Negative for rash.   Neurological: Negative for weakness and headaches.   Hematological: Does not bruise/bleed easily.      Objective:     Vital Signs (Most Recent):  Temp: 96 °F (35.6 °C) (07/26/18 1958)  Pulse: 72 (07/26/18 2000)  Resp: 16 (07/26/18 1958)  BP: (!) 130/91 (07/26/18 1958)  SpO2: 97 % (07/26/18 1958) Vital Signs (24h Range):  Temp:  [96 °F (35.6 °C)-98.2 °F (36.8 °C)] 96 °F (35.6 °C)  Pulse:  [63-88] 72  Resp:  [16-17] 16  SpO2:  [90 %-97 %] 97 %  BP: (127-152)/(74-94) 130/91     Weight: 45.8 kg (100 lb 15.5 oz)  Body mass index is 16.8 kg/m².    Intake/Output Summary (Last 24 hours) at 07/26/18 2049  Last data filed at 07/26/18 1800   Gross per 24 hour   Intake              840 ml   Output                0 ml   Net              840 ml      Physical Exam  Constitutional: She is oriented to person, place, and time. No distress. Thin and frail .   Cardiovascular: Normal rate and regular rhythm.  normal heart sounds and intact distal pulses.   Pulmonary/Chest: Breath sounds normal. No stridor. No respiratory distress. She has no  wheezes. She has no rhonchi. She has no rales.   Abdominal: Soft. Bowel sounds are normal. There is no tenderness.   LLQ healed incision from previous renal allograft removal   Musculoskeletal: Normal range of motion. She exhibits no edema.   Neurological: She is alert and oriented to person, place, and time.   Skin: Skin is warm and dry.   L thigh graft good bruit   Psychiatric: She has a normal mood and affect.     MELD-Na score: 21 at 7/26/2018  3:49 AM  MELD score: 20 at 7/26/2018  3:49 AM  Calculated from:  Serum Creatinine: 5.9 mg/dL (Rounded to 4) at 7/26/2018  3:49 AM  Serum Sodium: 136 mmol/L at 7/26/2018  3:49 AM  Total Bilirubin: 0.6 mg/dL (Rounded to 1) at 7/26/2018  3:49 AM  INR(ratio): 1 at 7/26/2018  3:49 AM  Age: 52 years    Significant Labs:  CBC:    Recent Labs  Lab 07/25/18  0456 07/26/18  0349 07/26/18  1621   WBC 4.62 5.82 5.15   HGB 11.2* 10.1* 10.0*   HCT 36.5* 33.0* 33.4*   * 114* 106*     CMP:    Recent Labs  Lab 07/25/18  0542 07/26/18  0349   * 136   K 4.3 4.0   CL 94* 100   CO2 24 25   * 68*   BUN 33* 12   CREATININE 11.2* 5.9*   CALCIUM 7.3* 8.8   PROT  --  7.5   ALBUMIN  --  2.5*   BILITOT  --  0.6   ALKPHOS  --  105   AST  --  16   ALT  --  <5*   ANIONGAP 16 11   EGFRNONAA 3.5* 7.5*     PTINR:    Recent Labs  Lab 07/25/18  0456 07/26/18  0349   INR 1.1 1.0       Significant Procedures:   Dobutamine Stress Test with Color Flow: No results found for this or any previous visit.    None    Assessment/Plan:      * Endoleak of aortic graft    - hx of AAA s/p EVAR 7/16/18 readmitted with AMS and found to have endoleak. Unfortunately, due to her multiple medical co-morbidities she is not a candidate for open surgical repair and due to her anatomy is not a candidate for endovascular intervention. Discussed with Dr Westfall, she is asymptomatic from the AAA for now.  She does still have flow into the saccular AAA; this may or may not be able to be treated with a longer aortic  cuff (70mm). He spoke with  twice and spoke with daughter, Gillian (RN, [phone]).    - Family is clear now that they do not want another surgery or procedure - even if minimally invasive. They want hospice care once she goes home.  - Family still want HD  - Discussed case with palliative care, they were unable to talk to family, unclear if able to get HD while on hospice  - Unable to discuss code status  - abd pain is better now, ctm  - cbc daily        Lytic bone lesion of hip    - CTA Abd: X2 noted. There is a predominantly lytic focus at the anterior right sacroiliac joint which is well circumscribed and demonstrates marginal sclerosis  - No hypercalcemia  - unclear if back pain is due to this  - Pt does not produce Urine, checking SPEP but does not expect hematological as the cause of her FTT and weakness            Adult failure to thrive    Moderate protein-calorie malnutrition  - Chronic per family report, pt report ~ 1 year of weight loss, weakness  - pt with hypoglycemia, lethargy, decreased po intake worsening since her surgery  - No sign of infection, no sign of Major bleeding as Hgb is stable but anemia can play a role in her FFT  - Checking TSH, cortisol, PTH  - PT/OT consulted: rec home only  - CTA abd 2x has not shown any oncological masses/ hip lytic lesion noted (see below)  - start pt on Megace trial              Essential hypertension    - continue home medications and monitor          ESRD (end stage renal disease)    - ESRD over 10 yrs sp failed kidney transplant after 5 years then 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 Previously onperitoneal dialysis but transition to iHD due to peritonitis.    - HD MWF,  Last HD Monday 7/23. No residual renal function.  - HD per nephro  - Renal diet   - Sevelamer WM        Hypoglycemia    - present on last admission as well  - Due to poor oral intake  - No hx of DM   - negative Cpeptide, insulin level, Cortisol level, TSH   - Q4hr  POCT  glucose, encourage intake, due to ESRD holding off D5 gtt for now, can temp start D10 gtt if persistently low          Peripheral arterial disease    - hold asa for now given concern for bleeding            VTE Risk Mitigation         Ordered     Place sequential compression device  Until discontinued      07/25/18 1240     IP VTE HIGH RISK PATIENT  Once      07/25/18 1240              Gallo Mattson MD  Department of Hospital Medicine   [facility]

## 2018-07-27 NOTE — PROGRESS NOTES
[facility] H[facility]ODIALYSIS NOTE     Patient currently on hemodialysis for removal of uremic toxins .     Patient seen and evaluated on hemodialysis, tolerating treatment, see HD flowsheet for vitals and assessments.      No Hypotension, chest pain, shortness of breath, cramping, nausea or vomiting. Denies back and abdominal pain.     AM labs pending collection.   L fem AVG no issues Qb 400.   Aim 3.5 hr and uF 600 ml     Celeste Ricks NP  Nephrology

## 2018-07-27 NOTE — ASSESSMENT & PLAN NOTE
- present on last admission as well  - Due to poor oral intake  - No hx of DM   - negative Cpeptide, insulin level, Cortisol level, TSH   - Q4hr  POCT glucose, encourage intake, due to ESRD holding off D5 gtt for now, can temp start D10 gtt if persistently low

## 2018-07-27 NOTE — PROGRESS NOTES
[facility] Hospital Medicine  Progress Note    Patient Name: Tony De Leon  MRN: [id number]  Patient Class: IP- Inpatient   Admission Date: 7/25/2018  Length of Stay: 2 days  Attending Physician: Gallo Mattson MD  Primary Care Provider: Primary Doctor [facility] Medicine Team: [facility] HOSP MED A Gallo Mattson MD    Subjective:     Principal Problem:Endoleak of aortic graft    HPI:  This is a 51 yo F with hypertension, PAD, current 1/2 pack a day smoker, ESRD over 10 yrs sp failed kidney transplant now on HD, and recent admission here for AAA sp EVAR on 7/16 who initially presented to [facility] in [place], [place] for lower back pain not associated with abdominal pain/N/V x 2-3 days in which CT showing aneurysm leak then transferred to [facility] for Vascular Surgery.  evaluation. Per OSH records, patient was found wandering in her yard when EMS arrived. Found to be severely hypoglycemic (20s) and reported to have improvement in her mental status after receiving glucose. However, OSH CT scan was concerning for aneurysmal expansion so she was transferred to [facility] ED for further evaluation. Vascular surgery evaluated, clinically she is asymptomatic from the AAA for now.  She still have flow into the saccular AAA. Vascular surgery team spoke with  twice (in early AM and in PM).  Also, spoke with daughter, Gillian (RN, [phone]).  Family is clear now that they do not want another surgery or procedure - even if minimally invasive.  Admit to hospital medicine for monitoring and eval for hospice.     She has had subjective weight loss over the year. But since her surgery she has not been doing much, per records, pt has a hx of non-compliance and last dialysis was 7/20. MWF HD. Of note, she has a hx of a prior renal transplant from 1141-9402, which had to be removed secondary to infection, SBP (while on PD), CHF. Surgical hx significant for PD catheter, renal transplant and explant,  tubal ligation, IVC filter unknown when placed. Per previous admission notes, she is unable to walk a block or up a flight of stairs.  No known MI/stroke. Also is noncompliant with her medications at home. On norvasc, lisinopril, and labetolol.     Hospital Course:  No notes on file    Interval History: Abdominal pain in HD . CM and palliative care were unable to reach family. Pt request to go home. Hgb dropping to 9    Review of Systems     Constitutional: Positive for activity change,  fatigue. Negative for chills, diaphoresis and fever.   HENT: Negative for sore throat.    Respiratory: Negative for shortness of breath.    Cardiovascular: Negative for chest pain.   Gastrointestinal: Negative for abdominal pain and nausea.   Genitourinary: Negative for dysuria and pelvic pain.   Musculoskeletal: Positive for back pain (lower; not currently)   Skin: Negative for rash.   Neurological: Negative for weakness and headaches.   Hematological: Does not bruise/bleed easily.      Objective:     Vital Signs (Most Recent):  Temp: (!) 95.3 °F (35.2 °C) (07/27/18 0746)  Pulse: 79 (07/27/18 1247)  Resp: 15 (07/27/18 1247)  BP: (!) 136/93 (07/27/18 1247)  SpO2: 97 % (07/27/18 1247) Vital Signs (24h Range):  Temp:  [94.8 °F (34.9 °C)-97.7 °F (36.5 °C)] 95.3 °F (35.2 °C)  Pulse:  [59-97] 79  Resp:  [13-20] 15  SpO2:  [97 %-100 %] 97 %  BP: (114-180)/() 136/93     Weight: 45.8 kg (100 lb 15.5 oz)  Body mass index is 16.8 kg/m².    Intake/Output Summary (Last 24 hours) at 07/27/18 1324  Last data filed at 07/27/18 1045   Gross per 24 hour   Intake             1580 ml   Output              551 ml   Net             1029 ml      Physical Exam    Constitutional: She is oriented to person, place, and time. No distress. Thin and frail .   Cardiovascular: Normal rate and regular rhythm.  normal heart sounds and intact distal pulses.   Pulmonary/Chest: Breath sounds normal. No stridor. No respiratory distress. She has no wheezes. She  has no rhonchi. She has no rales.   Abdominal: Soft. Bowel sounds are normal. There is no tenderness.   LLQ healed incision from previous renal allograft removal   Musculoskeletal: Normal range of motion. She exhibits no edema.   Neurological: She is alert and oriented to person, place, and time.   Skin: Skin is warm and dry.   L thigh graft good bruit   Psychiatric: She has a normal mood and affect.     MELD-Na score: 22 at 7/27/2018  8:50 AM  MELD score: 20 at 7/27/2018  8:50 AM  Calculated from:  Serum Creatinine: 5.6 mg/dL (Rounded to 4) at 7/27/2018  8:50 AM  Serum Sodium: 134 mmol/L at 7/27/2018  8:50 AM  Total Bilirubin: 0.6 mg/dL (Rounded to 1) at 7/26/2018  8:14 PM  INR(ratio): 1 at 7/26/2018  3:49 AM  Age: 52 years    Significant Labs:  CBC:    Recent Labs  Lab 07/26/18  0349 07/26/18  1621 07/27/18  0850   WBC 5.82 5.15 3.79*   HGB 10.1* 10.0* 9.0*   HCT 33.0* 33.4* 28.2*   * 106* 120*     CMP:    Recent Labs  Lab 07/26/18 0349 07/26/18 2014 07/27/18  0850    135* 134*   K 4.0 4.3 4.1    99 101   CO2 25 22* 23   GLU 68* 77 80   BUN 12 15 14   CREATININE 5.9* 6.9* 5.6*   CALCIUM 8.8 8.3* 7.8*   PROT 7.5 7.7  --    ALBUMIN 2.5* 2.7* 2.4*   BILITOT 0.6 0.6  --    ALKPHOS 105 104  --    AST 16 17  --    ALT <5* <5*  --    ANIONGAP 11 14 10   EGFRNONAA 7.5* 6.2* 8.0*     PTINR:    Recent Labs  Lab 07/26/18 0349   INR 1.0       Significant Procedures:   Dobutamine Stress Test with Color Flow: No results found for this or any previous visit.    None    Assessment/Plan:      * Endoleak of aortic graft    - hx of AAA s/p EVAR 7/16/18 readmitted with AMS and found to have endoleak. Unfortunately, due to her multiple medical co-morbidities she is not a candidate for open surgical repair and due to her anatomy is not a candidate for endovascular intervention. Discussed with Dr Westfall, she is asymptomatic from the AAA for now.  She does still have flow into the saccular AAA; this may or may not be  able to be treated with a longer aortic cuff (70mm). He spoke with  twice and spoke with daughter, Gillian (RN, [phone]).    - Family is clear now that they do not want another surgery or procedure - even if minimally invasive. They want hospice care once she goes home.  - Family still want HD  - Discussed case with palliative care, they were unable to talk to family, unclear if able to get HD while on hospice  - Unable to discuss code status  - abd pain is off and on  - cbc daily  - concern for wanting HD in pt that is slowly bleeding. Will need to address family once again but unable to reach them        Lytic bone lesion of hip    - CTA Abd: X2 noted. There is a predominantly lytic focus at the anterior right sacroiliac joint which is well circumscribed and demonstrates marginal sclerosis  - No hypercalcemia  - unclear if back pain is due to this  - Pt does not produce Urine, checking SPEP but does not expect hematological as the cause of her FTT and weakness            Adult failure to thrive    Moderate protein-calorie malnutrition  - Chronic per family report, pt report ~ 1 year of weight loss, weakness  - pt with hypoglycemia, lethargy, decreased po intake worsening since her surgery  - No sign of infection, no sign of Major bleeding as Hgb is stable but anemia can play a role in her FFT  - Checking TSH, cortisol, PTH  - PT/OT consulted: rec home only  - CTA abd 2x has not shown any oncological masses/ hip lytic lesion noted (see below)  - start pt on Megace trial              Essential hypertension    - continue home medications and monitor          ESRD (end stage renal disease)    - ESRD over 10 yrs sp failed kidney transplant after 5 years then 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 Previously onperitoneal dialysis but transition to iHD due to peritonitis.    - HD MWF,  Last HD Monday 7/23. No residual renal function.  - HD per nephro  - Renal diet   - Sevelamer WM        Hypoglycemia    -  present on last admission as well  - Due to poor oral intake  - No hx of DM   - negative Cpeptide, insulin level, Cortisol level, TSH   - Q4hr  POCT glucose, encourage intake, due to ESRD holding off D5 gtt for now, can temp start D10 gtt if persistently low          Peripheral arterial disease    - hold asa for now given concern for bleeding            VTE Risk Mitigation         Ordered     Place sequential compression device  Until discontinued      07/25/18 1240     IP VTE HIGH RISK PATIENT  Once      07/25/18 1240              Gallo Mattson MD  Department of Hospital Medicine   [facility]

## 2018-07-27 NOTE — PLAN OF CARE
Problem: Patient Care Overview  Goal: Plan of Care Review  Outcome: Ongoing (interventions implemented as appropriate)   07/27/18 1826   Coping/Psychosocial   Plan Of Care Reviewed With patient       Problem: Coping, Compromised Individual (Adult,Obstetrics,Pediatric)  Goal: Identify Related Risk Factors and Signs and Symptoms  Related risk factors and signs and symptoms are identified upon initiation of Human Response Clinical Practice Guideline (CPG)   Outcome: Ongoing (interventions implemented as appropriate)   07/27/18 1826   Coping, Compromised Individual   Related Risk Factors (Compromised Individual Coping) body part loss or change;cognitive impairment;coping skills ineffective;health status change;prognosis/treatment plan;severe chronic pain   Signs and Symptoms (Compromised Individual Coping) coping mechanisms inappropriate;decision-making/problem-solving deficit

## 2018-07-27 NOTE — ASSESSMENT & PLAN NOTE
Moderate protein-calorie malnutrition  - Chronic per family report, pt report ~ 1 year of weight loss, weakness  - pt with hypoglycemia, lethargy, decreased po intake worsening since her surgery  - No sign of infection, no sign of Major bleeding as Hgb is stable but anemia can play a role in her FFT  - Checking TSH, cortisol, PTH  - PT/OT consulted: rec home only  - CTA abd 2x has not shown any oncological masses/ hip lytic lesion noted (see below)  - start pt on Megace trial

## 2018-07-27 NOTE — PROGRESS NOTES
HD treatment ended 1 hr. Early, due to pt. Complaining of abd. Pain. Duration of treatment 2 hours and no fluid removed. Needles removed and hemostasis achieved. Dressing intact and no drainage noted. Thrill and bruit present.

## 2018-07-27 NOTE — PLAN OF CARE
Saul De Leon Spouse     [phone]     Per [facility], pt wants to keep HD so can't qualify for hospice  Cm called spouse, no answer, mailbox full    Cm went to room; pt not in room        Comment     Kassidy Luque Daughter [phone]       - no answer, mailbox full, can't leave msg

## 2018-07-27 NOTE — NURSING
Frances and received call back from Dr. Mattson reported to him that pt's temp continues to remain low and reported her bp reading. No new orders given at this time.

## 2018-07-27 NOTE — SUBJECTIVE & OBJECTIVE
Interval History: Abdominal pain in HD . CM and palliative care were unable to reach family. Pt request to go home. Hgb dropping to 9    Review of Systems     Constitutional: Positive for activity change,  fatigue. Negative for chills, diaphoresis and fever.   HENT: Negative for sore throat.    Respiratory: Negative for shortness of breath.    Cardiovascular: Negative for chest pain.   Gastrointestinal: Negative for abdominal pain and nausea.   Genitourinary: Negative for dysuria and pelvic pain.   Musculoskeletal: Positive for back pain (lower; not currently)   Skin: Negative for rash.   Neurological: Negative for weakness and headaches.   Hematological: Does not bruise/bleed easily.      Objective:     Vital Signs (Most Recent):  Temp: (!) 95.3 °F (35.2 °C) (07/27/18 0746)  Pulse: 79 (07/27/18 1247)  Resp: 15 (07/27/18 1247)  BP: (!) 136/93 (07/27/18 1247)  SpO2: 97 % (07/27/18 1247) Vital Signs (24h Range):  Temp:  [94.8 °F (34.9 °C)-97.7 °F (36.5 °C)] 95.3 °F (35.2 °C)  Pulse:  [59-97] 79  Resp:  [13-20] 15  SpO2:  [97 %-100 %] 97 %  BP: (114-180)/() 136/93     Weight: 45.8 kg (100 lb 15.5 oz)  Body mass index is 16.8 kg/m².    Intake/Output Summary (Last 24 hours) at 07/27/18 1324  Last data filed at 07/27/18 1045   Gross per 24 hour   Intake             1580 ml   Output              551 ml   Net             1029 ml      Physical Exam    Constitutional: She is oriented to person, place, and time. No distress. Thin and frail .   Cardiovascular: Normal rate and regular rhythm.  normal heart sounds and intact distal pulses.   Pulmonary/Chest: Breath sounds normal. No stridor. No respiratory distress. She has no wheezes. She has no rhonchi. She has no rales.   Abdominal: Soft. Bowel sounds are normal. There is no tenderness.   LLQ healed incision from previous renal allograft removal   Musculoskeletal: Normal range of motion. She exhibits no edema.   Neurological: She is alert and oriented to person, place,  and time.   Skin: Skin is warm and dry.   L thigh graft good bruit   Psychiatric: She has a normal mood and affect.     MELD-Na score: 22 at 7/27/2018  8:50 AM  MELD score: 20 at 7/27/2018  8:50 AM  Calculated from:  Serum Creatinine: 5.6 mg/dL (Rounded to 4) at 7/27/2018  8:50 AM  Serum Sodium: 134 mmol/L at 7/27/2018  8:50 AM  Total Bilirubin: 0.6 mg/dL (Rounded to 1) at 7/26/2018  8:14 PM  INR(ratio): 1 at 7/26/2018  3:49 AM  Age: 52 years    Significant Labs:  CBC:    Recent Labs  Lab 07/26/18  0349 07/26/18  1621 07/27/18  0850   WBC 5.82 5.15 3.79*   HGB 10.1* 10.0* 9.0*   HCT 33.0* 33.4* 28.2*   * 106* 120*     CMP:    Recent Labs  Lab 07/26/18 0349 07/26/18 2014 07/27/18  0850    135* 134*   K 4.0 4.3 4.1    99 101   CO2 25 22* 23   GLU 68* 77 80   BUN 12 15 14   CREATININE 5.9* 6.9* 5.6*   CALCIUM 8.8 8.3* 7.8*   PROT 7.5 7.7  --    ALBUMIN 2.5* 2.7* 2.4*   BILITOT 0.6 0.6  --    ALKPHOS 105 104  --    AST 16 17  --    ALT <5* <5*  --    ANIONGAP 11 14 10   EGFRNONAA 7.5* 6.2* 8.0*     PTINR:    Recent Labs  Lab 07/26/18 0349   INR 1.0       Significant Procedures:   Dobutamine Stress Test with Color Flow: No results found for this or any previous visit.    None

## 2018-07-28 PROBLEM — Z51.5 PALLIATIVE CARE ENCOUNTER: Status: ACTIVE | Noted: 2018-07-28

## 2018-07-28 LAB
ALBUMIN SERPL BCP-MCNC: 2.6 G/DL
ANION GAP SERPL CALC-SCNC: 12 MMOL/L
BASOPHILS # BLD AUTO: 0.02 K/UL
BASOPHILS NFR BLD: 0.3 %
BUN SERPL-MCNC: 12 MG/DL
CALCIUM SERPL-MCNC: 8 MG/DL
CHLORIDE SERPL-SCNC: 102 MMOL/L
CO2 SERPL-SCNC: 23 MMOL/L
CREAT SERPL-MCNC: 5.6 MG/DL
DIFFERENTIAL METHOD: ABNORMAL
EOSINOPHIL # BLD AUTO: 0.2 K/UL
EOSINOPHIL NFR BLD: 2.7 %
ERYTHROCYTE [DISTWIDTH] IN BLOOD BY AUTOMATED COUNT: 17.4 %
EST. GFR  (AFRICAN AMERICAN): 9.2 ML/MIN/1.73 M^2
EST. GFR  (NON AFRICAN AMERICAN): 8 ML/MIN/1.73 M^2
GLUCOSE SERPL-MCNC: 73 MG/DL
HCT VFR BLD AUTO: 28.8 %
HGB BLD-MCNC: 8.8 G/DL
IMM GRANULOCYTES # BLD AUTO: 0.02 K/UL
IMM GRANULOCYTES NFR BLD AUTO: 0.3 %
LYMPHOCYTES # BLD AUTO: 0.6 K/UL
LYMPHOCYTES NFR BLD: 9.6 %
MCH RBC QN AUTO: 27.8 PG
MCHC RBC AUTO-ENTMCNC: 30.6 G/DL
MCV RBC AUTO: 91 FL
MONOCYTES # BLD AUTO: 0.6 K/UL
MONOCYTES NFR BLD: 9.7 %
NEUTROPHILS # BLD AUTO: 4.6 K/UL
NEUTROPHILS NFR BLD: 77.4 %
NRBC BLD-RTO: 0 /100 WBC
PHOSPHATE SERPL-MCNC: 3.1 MG/DL
PLATELET # BLD AUTO: 117 K/UL
PMV BLD AUTO: 10.8 FL
POCT GLUCOSE: 179 MG/DL (ref 70–110)
POCT GLUCOSE: 67 MG/DL (ref 70–110)
POCT GLUCOSE: 77 MG/DL (ref 70–110)
POCT GLUCOSE: 80 MG/DL (ref 70–110)
POTASSIUM SERPL-SCNC: 4.4 MMOL/L
RBC # BLD AUTO: 3.16 M/UL
SODIUM SERPL-SCNC: 137 MMOL/L
WBC # BLD AUTO: 5.95 K/UL

## 2018-07-28 PROCEDURE — 80069 RENAL FUNCTION PANEL: CPT

## 2018-07-28 PROCEDURE — 85025 COMPLETE CBC W/AUTO DIFF WBC: CPT

## 2018-07-28 PROCEDURE — S0179 MEGESTROL 20 MG: HCPCS | Performed by: HOSPITALIST

## 2018-07-28 PROCEDURE — 36415 COLL VENOUS BLD VENIPUNCTURE: CPT

## 2018-07-28 PROCEDURE — 25000003 PHARM REV CODE 250: Performed by: HOSPITALIST

## 2018-07-28 PROCEDURE — 99232 SBSQ HOSP IP/OBS MODERATE 35: CPT | Mod: ,,, | Performed by: HOSPITALIST

## 2018-07-28 PROCEDURE — 11000001 HC ACUTE MED/SURG PRIVATE ROOM

## 2018-07-28 RX ORDER — AMLODIPINE BESYLATE 10 MG/1
10 TABLET ORAL DAILY
Status: DISCONTINUED | OUTPATIENT
Start: 2018-07-29 | End: 2018-07-30 | Stop reason: HOSPADM

## 2018-07-28 RX ORDER — AMLODIPINE BESYLATE 5 MG/1
5 TABLET ORAL ONCE
Status: COMPLETED | OUTPATIENT
Start: 2018-07-28 | End: 2018-07-28

## 2018-07-28 RX ADMIN — HYDROXYZINE HYDROCHLORIDE 25 MG: 25 TABLET, FILM COATED ORAL at 04:07

## 2018-07-28 RX ADMIN — SEVELAMER CARBONATE 800 MG: 800 TABLET, FILM COATED ORAL at 01:07

## 2018-07-28 RX ADMIN — SENNOSIDES AND DOCUSATE SODIUM 1 TABLET: 8.6; 5 TABLET ORAL at 09:07

## 2018-07-28 RX ADMIN — MEGESTROL ACETATE 200 MG: 40 SUSPENSION ORAL at 01:07

## 2018-07-28 RX ADMIN — MEGESTROL ACETATE 200 MG: 40 SUSPENSION ORAL at 05:07

## 2018-07-28 RX ADMIN — LISINOPRIL 40 MG: 20 TABLET ORAL at 09:07

## 2018-07-28 RX ADMIN — METOPROLOL SUCCINATE 100 MG: 100 TABLET, EXTENDED RELEASE ORAL at 09:07

## 2018-07-28 RX ADMIN — PANTOPRAZOLE SODIUM 40 MG: 40 TABLET, DELAYED RELEASE ORAL at 09:07

## 2018-07-28 RX ADMIN — HYDRALAZINE HYDROCHLORIDE 50 MG: 50 TABLET ORAL at 09:07

## 2018-07-28 RX ADMIN — ONDANSETRON 8 MG: 8 TABLET, ORALLY DISINTEGRATING ORAL at 10:07

## 2018-07-28 RX ADMIN — HYDROCODONE BITARTRATE AND ACETAMINOPHEN 1 TABLET: 5; 325 TABLET ORAL at 05:07

## 2018-07-28 RX ADMIN — ATORVASTATIN CALCIUM 40 MG: 20 TABLET, FILM COATED ORAL at 09:07

## 2018-07-28 RX ADMIN — HYDROXYZINE HYDROCHLORIDE 25 MG: 25 TABLET, FILM COATED ORAL at 09:07

## 2018-07-28 RX ADMIN — SEVELAMER CARBONATE 800 MG: 800 TABLET, FILM COATED ORAL at 05:07

## 2018-07-28 RX ADMIN — AMLODIPINE BESYLATE 5 MG: 5 TABLET ORAL at 09:07

## 2018-07-28 RX ADMIN — Medication 16 G: at 10:07

## 2018-07-28 RX ADMIN — DEXTROSE MONOHYDRATE 12.5 G: 25 INJECTION, SOLUTION INTRAVENOUS at 09:07

## 2018-07-28 RX ADMIN — SEVELAMER CARBONATE 800 MG: 800 TABLET, FILM COATED ORAL at 09:07

## 2018-07-28 RX ADMIN — HYDRALAZINE HYDROCHLORIDE 50 MG: 50 TABLET ORAL at 01:07

## 2018-07-28 RX ADMIN — AMLODIPINE BESYLATE 5 MG: 5 TABLET ORAL at 10:07

## 2018-07-28 NOTE — ASSESSMENT & PLAN NOTE
- Discussed with daughter, Susanna Gomes (daughter), [phone], in agreement of no intervention. She and her family would like that Ms robles get HD (she often only goes once a week), I discussed with Ms Gomes I suspect her abdominal pain in HD is due to her blood pressure being elevated at that time. I am trying to make sure her blood pressure regiment is better  - Family is in agreement of hospice but given that they want HD, they would like to take her home. Daughter states that all the pt wants is to be comfortable at home. We will respect their wishes, our goal is to get her home.  - Daughter report that her mom does not like the hospital food and at home she has very good appetite eating fast food.   - I discussed with her the possibility of outpatient palliative care to follow up on patient and family so when they decide that they no longer wants HD they can get hospice care, she would like this if possible, I will ask palliative care team if this is an option  - plan for D/C on Monday once we set up everything.

## 2018-07-28 NOTE — NURSING
Report from Angelica Brandt, patient lying in bed, warming blanket on, no acute distress noted, will monitor.

## 2018-07-28 NOTE — PLAN OF CARE
"Problem: Patient Care Overview  Goal: Plan of Care Review  Pt is AAOX2 to self and place. Able to ambulate with SBA. Pt ambulated x 1 this shift with charge nurse. Pt is oliguric. No urine output. Fistula noted to LLE +/+. Limb alert in place. Pt states she had a bm yesterday. daughter called and spoke with this nurse about pts dietary intake. Daughter stated "my mom is spoiled and noncompliant, she only eats popeyes chicken". This nurse has been giving pt megace and checking blood sugars due to poor intake. Pt has had one cup of juice this shift and very little water. Refused novasource nutrition shake. Modified antihypertensive meds, BP controlled in the 120s systolic. [employer]asys camera in room. Call light in reach.       "

## 2018-07-28 NOTE — ASSESSMENT & PLAN NOTE
- suspect pt with BP elevation in HD that is causing her intense abd pain  - Adjusting regiment: hydralazine, lisinopril and amlodipine  - CTM her BP

## 2018-07-28 NOTE — SUBJECTIVE & OBJECTIVE
Interval History: Abdominal pain, Nausea today not wanting to eat today. Call daughter today updated on goal of care.     Review of Systems  Constitutional: Positive for activity change,  fatigue. Negative for chills, diaphoresis and fever.   HENT: Negative for sore throat.    Respiratory: Negative for shortness of breath.    Cardiovascular: Negative for chest pain.   Gastrointestinal: Negative for abdominal pain and nausea.   Genitourinary: Negative for dysuria and pelvic pain.   Musculoskeletal: Positive for back pain (lower; not currently)   Skin: Negative for rash.   Neurological: Negative for weakness and headaches.   Hematological: Does not bruise/bleed easily.      Objective:     Vital Signs (Most Recent):  Temp: 97.6 °F (36.4 °C) (07/28/18 1525)  Pulse: 68 (07/28/18 1525)  Resp: 18 (07/28/18 1525)  BP: 129/82 (07/28/18 1525)  SpO2: (!) 93 % (07/28/18 1525) Vital Signs (24h Range):  Temp:  [95 °F (35 °C)-98.6 °F (37 °C)] 97.6 °F (36.4 °C)  Pulse:  [62-73] 68  Resp:  [15-18] 18  SpO2:  [93 %-100 %] 93 %  BP: (125-168)/(81-98) 129/82     Weight: 45.8 kg (100 lb 15.5 oz)  Body mass index is 16.8 kg/m².    Intake/Output Summary (Last 24 hours) at 07/28/18 1554  Last data filed at 07/27/18 1630   Gross per 24 hour   Intake              240 ml   Output                0 ml   Net              240 ml      Physical Exam    Constitutional: She is oriented to person, place, and time. No distress. Thin and frail .   Cardiovascular: Normal rate and regular rhythm.  normal heart sounds and intact distal pulses.   Pulmonary/Chest: Breath sounds normal. No stridor. No respiratory distress. She has no wheezes. She has no rhonchi. She has no rales.   Abdominal: Soft. Bowel sounds are normal. There is no tenderness.   LLQ healed incision from previous renal allograft removal   Musculoskeletal: Normal range of motion. She exhibits no edema.   Neurological: She is alert and oriented to person, place, and time.   Skin: Skin is  warm and dry.   L thigh graft good bruit   Psychiatric: She has a normal mood and affect.     MELD-Na score: 20 at 7/28/2018  3:46 AM  MELD score: 20 at 7/28/2018  3:46 AM  Calculated from:  Serum Creatinine: 5.6 mg/dL (Rounded to 4) at 7/28/2018  3:46 AM  Serum Sodium: 137 mmol/L at 7/28/2018  3:46 AM  Total Bilirubin: 0.6 mg/dL (Rounded to 1) at 7/26/2018  8:14 PM  INR(ratio): 1 at 7/26/2018  3:49 AM  Age: 52 years    Significant Labs:  CBC:    Recent Labs  Lab 07/26/18  1621 07/27/18  0850 07/28/18  0346   WBC 5.15 3.79* 5.95   HGB 10.0* 9.0* 8.8*   HCT 33.4* 28.2* 28.8*   * 120* 117*     CMP:    Recent Labs  Lab 07/26/18  2014 07/27/18  0850 07/28/18  0346   * 134* 137   K 4.3 4.1 4.4   CL 99 101 102   CO2 22* 23 23   GLU 77 80 73   BUN 15 14 12   CREATININE 6.9* 5.6* 5.6*   CALCIUM 8.3* 7.8* 8.0*   PROT 7.7  --   --    ALBUMIN 2.7* 2.4* 2.6*   BILITOT 0.6  --   --    ALKPHOS 104  --   --    AST 17  --   --    ALT <5*  --   --    ANIONGAP 14 10 12   EGFRNONAA 6.2* 8.0* 8.0*     PTINR:  No results for input(s): INR in the last 48 hours.    Significant Procedures:   Dobutamine Stress Test with Color Flow: No results found for this or any previous visit.    None

## 2018-07-28 NOTE — PROGRESS NOTES
Pt had a CBG of 67 this am before breakfast. Pt refused to eat the glucose tabs. Pt did not have a PIV. This nurse started a PIV to LFA. Flushed without difficulty, blood return noted. Pt stated it was burning badly and would not let this nurse push glucagon. Got pt apple juice. Pt drank one cup, 180cc. Re-checked cbg and it was 176. Pt states she cant eat because she is nauseous. zofran SL tab given. Pt had a bm yesterday. Wctm.

## 2018-07-28 NOTE — NURSING
Report given to FALLON Murry, Day Nurse, patient dozing, no distress noted, did well rest of the shift with Tele sitter in the room.

## 2018-07-28 NOTE — NURSING
Patient has been moved to private room, able to keep warmer now, warming blanket in place, c/o back hurting and itchy, applied lotion for her, and tried repositioning, Tele sitter placed in room, Bed alarm activated, as she got OOB two times previously, confused. She will not leave an IV in place, has pulled out three, several attempts to place a new one, no success, will place PICC Team consult, frequent rounding will continue to monitor.

## 2018-07-29 LAB
ALBUMIN SERPL BCP-MCNC: 2.3 G/DL
ANION GAP SERPL CALC-SCNC: 10 MMOL/L
BASOPHILS # BLD AUTO: 0.02 K/UL
BASOPHILS NFR BLD: 0.4 %
BUN SERPL-MCNC: 17 MG/DL
CALCIUM SERPL-MCNC: 7.9 MG/DL
CHLORIDE SERPL-SCNC: 101 MMOL/L
CO2 SERPL-SCNC: 22 MMOL/L
CREAT SERPL-MCNC: 7.1 MG/DL
DIFFERENTIAL METHOD: ABNORMAL
EOSINOPHIL # BLD AUTO: 0.1 K/UL
EOSINOPHIL NFR BLD: 2.3 %
ERYTHROCYTE [DISTWIDTH] IN BLOOD BY AUTOMATED COUNT: 17.2 %
EST. GFR  (AFRICAN AMERICAN): 6.9 ML/MIN/1.73 M^2
EST. GFR  (NON AFRICAN AMERICAN): 6 ML/MIN/1.73 M^2
GLUCOSE SERPL-MCNC: 60 MG/DL
HCT VFR BLD AUTO: 27.4 %
HGB BLD-MCNC: 8.7 G/DL
IMM GRANULOCYTES # BLD AUTO: 0.01 K/UL
IMM GRANULOCYTES NFR BLD AUTO: 0.2 %
LYMPHOCYTES # BLD AUTO: 0.8 K/UL
LYMPHOCYTES NFR BLD: 17.2 %
MCH RBC QN AUTO: 28.3 PG
MCHC RBC AUTO-ENTMCNC: 31.8 G/DL
MCV RBC AUTO: 89 FL
MONOCYTES # BLD AUTO: 0.5 K/UL
MONOCYTES NFR BLD: 10.6 %
NEUTROPHILS # BLD AUTO: 3.3 K/UL
NEUTROPHILS NFR BLD: 69.3 %
NRBC BLD-RTO: 0 /100 WBC
PHOSPHATE SERPL-MCNC: 3.1 MG/DL
PLATELET # BLD AUTO: 120 K/UL
PMV BLD AUTO: 11.2 FL
POCT GLUCOSE: 112 MG/DL (ref 70–110)
POCT GLUCOSE: 60 MG/DL (ref 70–110)
POCT GLUCOSE: 65 MG/DL (ref 70–110)
POCT GLUCOSE: 73 MG/DL (ref 70–110)
POCT GLUCOSE: 92 MG/DL (ref 70–110)
POTASSIUM SERPL-SCNC: 4.5 MMOL/L
RBC # BLD AUTO: 3.07 M/UL
SODIUM SERPL-SCNC: 133 MMOL/L
WBC # BLD AUTO: 4.72 K/UL

## 2018-07-29 PROCEDURE — 11000001 HC ACUTE MED/SURG PRIVATE ROOM

## 2018-07-29 PROCEDURE — 36415 COLL VENOUS BLD VENIPUNCTURE: CPT

## 2018-07-29 PROCEDURE — 80069 RENAL FUNCTION PANEL: CPT

## 2018-07-29 PROCEDURE — S0179 MEGESTROL 20 MG: HCPCS | Performed by: HOSPITALIST

## 2018-07-29 PROCEDURE — 85025 COMPLETE CBC W/AUTO DIFF WBC: CPT

## 2018-07-29 PROCEDURE — 25000003 PHARM REV CODE 250: Performed by: HOSPITALIST

## 2018-07-29 PROCEDURE — 99232 SBSQ HOSP IP/OBS MODERATE 35: CPT | Mod: ,,, | Performed by: HOSPITALIST

## 2018-07-29 RX ORDER — SODIUM CHLORIDE 9 MG/ML
INJECTION, SOLUTION INTRAVENOUS
Status: DISCONTINUED | OUTPATIENT
Start: 2018-07-30 | End: 2018-07-30 | Stop reason: HOSPADM

## 2018-07-29 RX ORDER — SODIUM CHLORIDE 9 MG/ML
INJECTION, SOLUTION INTRAVENOUS ONCE
Status: DISCONTINUED | OUTPATIENT
Start: 2018-07-30 | End: 2018-07-30 | Stop reason: HOSPADM

## 2018-07-29 RX ADMIN — MEGESTROL ACETATE 200 MG: 40 SUSPENSION ORAL at 10:07

## 2018-07-29 RX ADMIN — HYDRALAZINE HYDROCHLORIDE 50 MG: 50 TABLET ORAL at 09:07

## 2018-07-29 RX ADMIN — MEGESTROL ACETATE 200 MG: 40 SUSPENSION ORAL at 06:07

## 2018-07-29 RX ADMIN — Medication 16 G: at 06:07

## 2018-07-29 RX ADMIN — LISINOPRIL 40 MG: 20 TABLET ORAL at 10:07

## 2018-07-29 RX ADMIN — MEGESTROL ACETATE 200 MG: 40 SUSPENSION ORAL at 01:07

## 2018-07-29 RX ADMIN — METOPROLOL SUCCINATE 100 MG: 100 TABLET, EXTENDED RELEASE ORAL at 10:07

## 2018-07-29 RX ADMIN — PANTOPRAZOLE SODIUM 40 MG: 40 TABLET, DELAYED RELEASE ORAL at 10:07

## 2018-07-29 RX ADMIN — HYDROXYZINE HYDROCHLORIDE 25 MG: 25 TABLET, FILM COATED ORAL at 09:07

## 2018-07-29 RX ADMIN — SENNOSIDES AND DOCUSATE SODIUM 1 TABLET: 8.6; 5 TABLET ORAL at 10:07

## 2018-07-29 RX ADMIN — AMLODIPINE BESYLATE 10 MG: 10 TABLET ORAL at 10:07

## 2018-07-29 RX ADMIN — HYDRALAZINE HYDROCHLORIDE 50 MG: 50 TABLET ORAL at 06:07

## 2018-07-29 RX ADMIN — HYDROXYZINE HYDROCHLORIDE 25 MG: 25 TABLET, FILM COATED ORAL at 10:07

## 2018-07-29 RX ADMIN — ATORVASTATIN CALCIUM 40 MG: 20 TABLET, FILM COATED ORAL at 10:07

## 2018-07-29 RX ADMIN — HYDROXYZINE HYDROCHLORIDE 25 MG: 25 TABLET, FILM COATED ORAL at 04:07

## 2018-07-29 RX ADMIN — SEVELAMER CARBONATE 800 MG: 800 TABLET, FILM COATED ORAL at 01:07

## 2018-07-29 RX ADMIN — SEVELAMER CARBONATE 800 MG: 800 TABLET, FILM COATED ORAL at 06:07

## 2018-07-29 RX ADMIN — HYDRALAZINE HYDROCHLORIDE 50 MG: 50 TABLET ORAL at 04:07

## 2018-07-29 NOTE — SUBJECTIVE & OBJECTIVE
Interval History: no abdominal pain today.  BP is better. Not wanting to eat our food.     Review of Systems  Constitutional: Positive for activity change,  fatigue. Negative for chills, diaphoresis and fever.   HENT: Negative for sore throat.    Respiratory: Negative for shortness of breath.    Cardiovascular: Negative for chest pain.   Gastrointestinal: Negative for abdominal pain and nausea.   Genitourinary: Negative for dysuria and pelvic pain.   Musculoskeletal: Positive for back pain (lower; not currently)   Skin: Negative for rash.   Neurological: Negative for weakness and headaches.   Hematological: Does not bruise/bleed easily.      Objective:     Vital Signs (Most Recent):  Temp: 97.5 °F (36.4 °C) (07/29/18 1522)  Pulse: 70 (07/29/18 1522)  Resp: 16 (07/29/18 1522)  BP: 116/73 (07/29/18 1522)  SpO2: (!) 92 % (07/29/18 1522) Vital Signs (24h Range):  Temp:  [97.5 °F (36.4 °C)-98.8 °F (37.1 °C)] 97.5 °F (36.4 °C)  Pulse:  [69-82] 70  Resp:  [16-18] 16  SpO2:  [92 %-100 %] 92 %  BP: ()/(60-84) 116/73     Weight: 45.8 kg (100 lb 15.5 oz)  Body mass index is 16.8 kg/m².    Intake/Output Summary (Last 24 hours) at 07/29/18 1757  Last data filed at 07/28/18 2230   Gross per 24 hour   Intake               30 ml   Output                0 ml   Net               30 ml      Physical Exam  Constitutional: She is oriented to person, place, and time. No distress. Thin and frail .   Cardiovascular: Normal rate and regular rhythm.  normal heart sounds and intact distal pulses.   Pulmonary/Chest: Breath sounds normal. No stridor. No respiratory distress. She has no wheezes. She has no rhonchi. She has no rales.   Abdominal: Soft. Bowel sounds are normal. There is no tenderness.   LLQ healed incision from previous renal allograft removal   Musculoskeletal: Normal range of motion. She exhibits no edema.   Neurological: She is alert and oriented to person, place, and time.   Skin: Skin is warm and dry.   L thigh graft  good bruit   Psychiatric: She has a normal mood and affect.     MELD-Na score: 20 at 7/28/2018  3:46 AM  MELD score: 20 at 7/28/2018  3:46 AM  Calculated from:  Serum Creatinine: 5.6 mg/dL (Rounded to 4) at 7/28/2018  3:46 AM  Serum Sodium: 137 mmol/L at 7/28/2018  3:46 AM  Total Bilirubin: 0.6 mg/dL (Rounded to 1) at 7/26/2018  8:14 PM  INR(ratio): 1 at 7/26/2018  3:49 AM  Age: 52 years    Significant Labs:  CBC:    Recent Labs  Lab 07/28/18  0346 07/29/18  0345   WBC 5.95 4.72   HGB 8.8* 8.7*   HCT 28.8* 27.4*   * 120*     CMP:    Recent Labs  Lab 07/28/18  0346 07/29/18  0345    133*   K 4.4 4.5    101   CO2 23 22*   GLU 73 60*   BUN 12 17   CREATININE 5.6* 7.1*   CALCIUM 8.0* 7.9*   ALBUMIN 2.6* 2.3*   ANIONGAP 12 10   EGFRNONAA 8.0* 6.0*     PTINR:  No results for input(s): INR in the last 48 hours.    Significant Procedures:   Dobutamine Stress Test with Color Flow: No results found for this or any previous visit.    None

## 2018-07-29 NOTE — PROGRESS NOTES
[facility] Hospital Medicine  Progress Note    Patient Name: Tony De Leon  MRN: [id number]  Patient Class: IP- Inpatient   Admission Date: 7/25/2018  Length of Stay: 4 days  Attending Physician: Gallo Mattson MD  Primary Care Provider: [facility] Medicine Team: [facility] HOSP MED A Gallo Mattson MD    Subjective:     Principal Problem:Endoleak of aortic graft    HPI:  This is a 53 yo F with hypertension, PAD, current 1/2 pack a day smoker, ESRD over 10 yrs sp failed kidney transplant now on HD, and recent admission here for AAA sp EVAR on 7/16 who initially presented to [facility] in [place], [place] for lower back pain not associated with abdominal pain/N/V x 2-3 days in which CT showing aneurysm leak then transferred to [facility] for Vascular Surgery.  evaluation. Per OSH records, patient was found wandering in her yard when EMS arrived. Found to be severely hypoglycemic (20s) and reported to have improvement in her mental status after receiving glucose. However, OSH CT scan was concerning for aneurysmal expansion so she was transferred to [facility] ED for further evaluation. Vascular surgery evaluated, clinically she is asymptomatic from the AAA for now.  She still have flow into the saccular AAA. Vascular surgery team spoke with  twice (in early AM and in PM).  Also, spoke with daughter, Gillian (RN, [phone]).  Family is clear now that they do not want another surgery or procedure - even if minimally invasive.  Admit to hospital medicine for monitoring and eval for hospice.     She has had subjective weight loss over the year. But since her surgery she has not been doing much, per records, pt has a hx of non-compliance and last dialysis was 7/20. MWF HD. Of note, she has a hx of a prior renal transplant from [id number], which had to be removed secondary to infection, SBP (while on PD), CHF. Surgical hx significant for PD catheter, renal transplant and explant,  tubal ligation, IVC filter unknown when placed. Per previous admission notes, she is unable to walk a block or up a flight of stairs.  No known MI/stroke. Also is noncompliant with her medications at home. On norvasc, lisinopril, and labetolol.     Hospital Course:  No notes on file    Interval History: no abdominal pain today.  BP is better. Not wanting to eat our food.     Review of Systems  Constitutional: Positive for activity change,  fatigue. Negative for chills, diaphoresis and fever.   HENT: Negative for sore throat.    Respiratory: Negative for shortness of breath.    Cardiovascular: Negative for chest pain.   Gastrointestinal: Negative for abdominal pain and nausea.   Genitourinary: Negative for dysuria and pelvic pain.   Musculoskeletal: Positive for back pain (lower; not currently)   Skin: Negative for rash.   Neurological: Negative for weakness and headaches.   Hematological: Does not bruise/bleed easily.      Objective:     Vital Signs (Most Recent):  Temp: 97.5 °F (36.4 °C) (07/29/18 1522)  Pulse: 70 (07/29/18 1522)  Resp: 16 (07/29/18 1522)  BP: 116/73 (07/29/18 1522)  SpO2: (!) 92 % (07/29/18 1522) Vital Signs (24h Range):  Temp:  [97.5 °F (36.4 °C)-98.8 °F (37.1 °C)] 97.5 °F (36.4 °C)  Pulse:  [69-82] 70  Resp:  [16-18] 16  SpO2:  [92 %-100 %] 92 %  BP: ()/(60-84) 116/73     Weight: 45.8 kg (100 lb 15.5 oz)  Body mass index is 16.8 kg/m².    Intake/Output Summary (Last 24 hours) at 07/29/18 1757  Last data filed at 07/28/18 2230   Gross per 24 hour   Intake               30 ml   Output                0 ml   Net               30 ml      Physical Exam  Constitutional: She is oriented to person, place, and time. No distress. Thin and frail .   Cardiovascular: Normal rate and regular rhythm.  normal heart sounds and intact distal pulses.   Pulmonary/Chest: Breath sounds normal. No stridor. No respiratory distress. She has no wheezes. She has no rhonchi. She has no rales.   Abdominal: Soft. Bowel  sounds are normal. There is no tenderness.   LLQ healed incision from previous renal allograft removal   Musculoskeletal: Normal range of motion. She exhibits no edema.   Neurological: She is alert and oriented to person, place, and time.   Skin: Skin is warm and dry.   L thigh graft good bruit   Psychiatric: She has a normal mood and affect.     MELD-Na score: 20 at 7/28/2018  3:46 AM  MELD score: 20 at 7/28/2018  3:46 AM  Calculated from:  Serum Creatinine: 5.6 mg/dL (Rounded to 4) at 7/28/2018  3:46 AM  Serum Sodium: 137 mmol/L at 7/28/2018  3:46 AM  Total Bilirubin: 0.6 mg/dL (Rounded to 1) at 7/26/2018  8:14 PM  INR(ratio): 1 at 7/26/2018  3:49 AM  Age: 52 years    Significant Labs:  CBC:    Recent Labs  Lab 07/28/18  0346 07/29/18  0345   WBC 5.95 4.72   HGB 8.8* 8.7*   HCT 28.8* 27.4*   * 120*     CMP:    Recent Labs  Lab 07/28/18  0346 07/29/18  0345    133*   K 4.4 4.5    101   CO2 23 22*   GLU 73 60*   BUN 12 17   CREATININE 5.6* 7.1*   CALCIUM 8.0* 7.9*   ALBUMIN 2.6* 2.3*   ANIONGAP 12 10   EGFRNONAA 8.0* 6.0*     PTINR:  No results for input(s): INR in the last 48 hours.    Significant Procedures:   Dobutamine Stress Test with Color Flow: No results found for this or any previous visit.    None    Assessment/Plan:      * Endoleak of aortic graft    - hx of AAA s/p EVAR 7/16/18 readmitted with AMS and found to have endoleak. Unfortunately, due to her multiple medical co-morbidities she is not a candidate for open surgical repair and due to her anatomy is not a candidate for endovascular intervention. Discussed with Dr Westfall, she is asymptomatic from the AAA for now.  She does still have flow into the saccular AAA; this may or may not be able to be treated with a longer aortic cuff (70mm). He spoke with  twice and spoke with daughter, Gillian (RN, [phone]).    - Family is clear now that they do not want another surgery or procedure - even if minimally invasive. They want  hospice care once she goes home.  - Family still want HD  - Discussed case with palliative care, they were unable to talk to family, unclear if able to get HD while on hospice  - Unable to discuss code status  - abd pain is off and on  - cbc daily  - concern for wanting HD in pt that is slowly bleeding. Will need to address family once again but unable to reach them        Essential hypertension    - suspect pt with BP elevation in HD that is causing her intense abd pain  - Adjusting regiment: hydralazine, lisinopril and amlodipine  - CTM her BP           Palliative care encounter    - Discussed with daughter, Susanna Gomes (daughter), [phone], in agreement of no intervention. She and her family would like that Ms robles get HD (she often only goes once a week), I discussed with Ms Gomes I suspect her abdominal pain in HD is due to her blood pressure being elevated at that time. I am trying to make sure her blood pressure regiment is better  - Family is in agreement of hospice but given that they want HD, they would like to take her home. Daughter states that all the pt wants is to be comfortable at home. We will respect their wishes, our goal is to get her home.  - Daughter report that her mom does not like the hospital food and at home she has very good appetite eating fast food.   - I discussed with her the possibility of outpatient palliative care to follow up on patient and family so when they decide that they no longer wants HD they can get hospice care, she would like this if possible, I will ask palliative care team if this is an option  - plan for D/C on Monday once we set up everything.          Lytic bone lesion of hip    - CTA Abd: X2 noted. There is a predominantly lytic focus at the anterior right sacroiliac joint which is well circumscribed and demonstrates marginal sclerosis  - No hypercalcemia  - unclear if back pain is due to this  - Pt does not produce Urine, checking SPEP but does not  expect hematological as the cause of her FTT and weakness            Adult failure to thrive    Moderate protein-calorie malnutrition  - Chronic per family report, pt report ~ 1 year of weight loss, weakness  - pt with hypoglycemia, lethargy, decreased po intake worsening since her surgery  - No sign of infection, no sign of Major bleeding as Hgb is stable but anemia can play a role in her FFT  - Checking TSH, cortisol, PTH  - PT/OT consulted: rec home only  - CTA abd 2x has not shown any oncological masses/ hip lytic lesion noted (see below)  - start pt on Megace trial              ESRD (end stage renal disease)    - ESRD over 10 yrs sp failed kidney transplant after 5 years then 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 Previously onperitoneal dialysis but transition to iHD due to peritonitis.    - HD MWF,  Last HD Monday 7/23. No residual renal function.  - HD per nephro  - Renal diet   - Sevelamer WM        Hypoglycemia    - present on last admission as well  - Due to poor oral intake  - No hx of DM   - negative Cpeptide, insulin level, Cortisol level, TSH   - Q4hr  POCT glucose, encourage intake, due to ESRD holding off D5 gtt for now, can temp start D10 gtt if persistently low          Peripheral arterial disease    - hold asa for now given concern for bleeding            VTE Risk Mitigation         Ordered     Place sequential compression device  Until discontinued      07/25/18 1240     IP VTE HIGH RISK PATIENT  Once      07/25/18 1240              Gallo Mattson MD  Department of Hospital Medicine   [facility]

## 2018-07-29 NOTE — PLAN OF CARE
Problem: Patient Care Overview  Goal: Plan of Care Review  Slept part of this shift. refuse to eat any food item for bedtime snack after I explain the purpose. But drank a few sip of apple juice. BG was 72 at HS. No fall this shift. Bed in low position Tele sitter in room. No complain

## 2018-07-29 NOTE — PROGRESS NOTES
A.M. Lab glucose was 60 treated with glucose tab as ordered.  Following treatment recheck BG result was 112

## 2018-07-30 VITALS
OXYGEN SATURATION: 98 % | HEIGHT: 65 IN | HEART RATE: 85 BPM | SYSTOLIC BLOOD PRESSURE: 141 MMHG | BODY MASS INDEX: 16.83 KG/M2 | WEIGHT: 101 LBS | TEMPERATURE: 99 F | DIASTOLIC BLOOD PRESSURE: 78 MMHG | RESPIRATION RATE: 16 BRPM

## 2018-07-30 LAB
ALBUMIN SERPL BCP-MCNC: 2.4 G/DL
ANION GAP SERPL CALC-SCNC: 11 MMOL/L
BASOPHILS # BLD AUTO: 0.03 K/UL
BASOPHILS NFR BLD: 0.6 %
BUN SERPL-MCNC: 22 MG/DL
CALCIUM SERPL-MCNC: 7.9 MG/DL
CHLORIDE SERPL-SCNC: 101 MMOL/L
CO2 SERPL-SCNC: 22 MMOL/L
CREAT SERPL-MCNC: 8.9 MG/DL
DIFFERENTIAL METHOD: ABNORMAL
EOSINOPHIL # BLD AUTO: 0.1 K/UL
EOSINOPHIL NFR BLD: 2.6 %
ERYTHROCYTE [DISTWIDTH] IN BLOOD BY AUTOMATED COUNT: 17.4 %
EST. GFR  (AFRICAN AMERICAN): 5.3 ML/MIN/1.73 M^2
EST. GFR  (NON AFRICAN AMERICAN): 4.6 ML/MIN/1.73 M^2
GLUCOSE SERPL-MCNC: 52 MG/DL
HCT VFR BLD AUTO: 26.9 %
HGB BLD-MCNC: 8.4 G/DL
IMM GRANULOCYTES # BLD AUTO: 0.02 K/UL
IMM GRANULOCYTES NFR BLD AUTO: 0.4 %
LYMPHOCYTES # BLD AUTO: 0.9 K/UL
LYMPHOCYTES NFR BLD: 19.2 %
MCH RBC QN AUTO: 27.8 PG
MCHC RBC AUTO-ENTMCNC: 31.2 G/DL
MCV RBC AUTO: 89 FL
MONOCYTES # BLD AUTO: 0.6 K/UL
MONOCYTES NFR BLD: 12.3 %
NEUTROPHILS # BLD AUTO: 3 K/UL
NEUTROPHILS NFR BLD: 64.9 %
NRBC BLD-RTO: 0 /100 WBC
PHOSPHATE SERPL-MCNC: 3.4 MG/DL
PLATELET # BLD AUTO: 135 K/UL
PMV BLD AUTO: 10.8 FL
POCT GLUCOSE: 70 MG/DL (ref 70–110)
POCT GLUCOSE: 75 MG/DL (ref 70–110)
POCT GLUCOSE: 86 MG/DL (ref 70–110)
POCT GLUCOSE: 95 MG/DL (ref 70–110)
POTASSIUM SERPL-SCNC: 4.6 MMOL/L
RBC # BLD AUTO: 3.02 M/UL
SODIUM SERPL-SCNC: 134 MMOL/L
WBC # BLD AUTO: 4.64 K/UL

## 2018-07-30 PROCEDURE — 80069 RENAL FUNCTION PANEL: CPT

## 2018-07-30 PROCEDURE — S0179 MEGESTROL 20 MG: HCPCS | Performed by: HOSPITALIST

## 2018-07-30 PROCEDURE — 85025 COMPLETE CBC W/AUTO DIFF WBC: CPT

## 2018-07-30 PROCEDURE — 90935 HEMODIALYSIS ONE EVALUATION: CPT | Mod: ,,, | Performed by: NURSE PRACTITIONER

## 2018-07-30 PROCEDURE — 90935 HEMODIALYSIS ONE EVALUATION: CPT

## 2018-07-30 PROCEDURE — 99239 HOSP IP/OBS DSCHRG MGMT >30: CPT | Mod: ,,, | Performed by: HOSPITALIST

## 2018-07-30 PROCEDURE — 25000003 PHARM REV CODE 250: Performed by: HOSPITALIST

## 2018-07-30 PROCEDURE — 36415 COLL VENOUS BLD VENIPUNCTURE: CPT

## 2018-07-30 RX ORDER — LANCING DEVICE
1 EACH MISCELLANEOUS 2 TIMES DAILY WITH MEALS
Qty: 1 EACH | Refills: 0 | Status: SHIPPED | OUTPATIENT
Start: 2018-07-30 | End: 2019-07-30

## 2018-07-30 RX ORDER — HYDROCODONE BITARTRATE AND ACETAMINOPHEN 5; 325 MG/1; MG/1
1 TABLET ORAL EVERY 6 HOURS PRN
Qty: 30 TABLET | Refills: 0 | Status: SHIPPED | OUTPATIENT
Start: 2018-07-30

## 2018-07-30 RX ORDER — HYDROCODONE BITARTRATE AND ACETAMINOPHEN 5; 325 MG/1; MG/1
1 TABLET ORAL EVERY 6 HOURS PRN
Qty: 30 TABLET | Refills: 0 | Status: SHIPPED | OUTPATIENT
Start: 2018-07-30 | End: 2018-07-30

## 2018-07-30 RX ORDER — AMLODIPINE BESYLATE 10 MG/1
10 TABLET ORAL DAILY
Qty: 30 TABLET | Refills: 11 | OUTPATIENT
Start: 2018-07-30 | End: 2018-07-30

## 2018-07-30 RX ORDER — AMLODIPINE BESYLATE 10 MG/1
10 TABLET ORAL DAILY
Qty: 30 TABLET | Refills: 11 | Status: SHIPPED | OUTPATIENT
Start: 2018-07-30 | End: 2019-07-30

## 2018-07-30 RX ORDER — LANCETS
1 EACH MISCELLANEOUS 2 TIMES DAILY WITH MEALS
Qty: 100 EACH | Refills: 11 | Status: SHIPPED | OUTPATIENT
Start: 2018-07-30

## 2018-07-30 RX ORDER — INSULIN PUMP SYRINGE, 3 ML
EACH MISCELLANEOUS
Qty: 1 EACH | Refills: 0 | Status: SHIPPED | OUTPATIENT
Start: 2018-07-30 | End: 2019-07-30

## 2018-07-30 RX ADMIN — SENNOSIDES AND DOCUSATE SODIUM 1 TABLET: 8.6; 5 TABLET ORAL at 12:07

## 2018-07-30 RX ADMIN — ATORVASTATIN CALCIUM 40 MG: 20 TABLET, FILM COATED ORAL at 12:07

## 2018-07-30 RX ADMIN — SEVELAMER CARBONATE 800 MG: 800 TABLET, FILM COATED ORAL at 12:07

## 2018-07-30 RX ADMIN — PANTOPRAZOLE SODIUM 40 MG: 40 TABLET, DELAYED RELEASE ORAL at 12:07

## 2018-07-30 RX ADMIN — HYDROXYZINE HYDROCHLORIDE 25 MG: 25 TABLET, FILM COATED ORAL at 12:07

## 2018-07-30 RX ADMIN — HYDRALAZINE HYDROCHLORIDE 50 MG: 50 TABLET ORAL at 05:07

## 2018-07-30 RX ADMIN — HYDRALAZINE HYDROCHLORIDE 50 MG: 50 TABLET ORAL at 03:07

## 2018-07-30 RX ADMIN — METOPROLOL SUCCINATE 100 MG: 100 TABLET, EXTENDED RELEASE ORAL at 12:07

## 2018-07-30 RX ADMIN — LISINOPRIL 40 MG: 20 TABLET ORAL at 12:07

## 2018-07-30 RX ADMIN — Medication 16 G: at 06:07

## 2018-07-30 RX ADMIN — MEGESTROL ACETATE 200 MG: 40 SUSPENSION ORAL at 12:07

## 2018-07-30 RX ADMIN — HYDROXYZINE HYDROCHLORIDE 25 MG: 25 TABLET, FILM COATED ORAL at 03:07

## 2018-07-30 RX ADMIN — AMLODIPINE BESYLATE 10 MG: 10 TABLET ORAL at 12:07

## 2018-07-30 NOTE — SUBJECTIVE & OBJECTIVE
Interval History: no abdominal pain today.  BP is better. HD today, eval post HD and D/C home    Review of Systems  Constitutional: Positive for activity change,  fatigue. Negative for chills, diaphoresis and fever.   HENT: Negative for sore throat.    Respiratory: Negative for shortness of breath.    Cardiovascular: Negative for chest pain.   Gastrointestinal: Negative for abdominal pain and nausea.   Genitourinary: Negative for dysuria and pelvic pain.   Musculoskeletal: Positive for back pain (lower; not currently)   Skin: Negative for rash.   Neurological: Negative for weakness and headaches.   Hematological: Does not bruise/bleed easily.      Objective:     Vital Signs (Most Recent):  Temp: 98.6 °F (37 °C) (07/30/18 0800)  Pulse: 74 (07/30/18 0820)  Resp: 16 (07/30/18 0800)  BP: 121/69 (07/30/18 0820)  SpO2: 98 % (07/30/18 0405) Vital Signs (24h Range):  Temp:  [97.5 °F (36.4 °C)-98.8 °F (37.1 °C)] 98.6 °F (37 °C)  Pulse:  [70-80] 74  Resp:  [16] 16  SpO2:  [92 %-98 %] 98 %  BP: (110-131)/(68-86) 121/69     Weight: 45.8 kg (100 lb 15.5 oz)  Body mass index is 16.8 kg/m².  No intake or output data in the 24 hours ending 07/30/18 0856   Physical Exam  Constitutional: She is oriented to person, place, and time. No distress. Thin and frail .   Cardiovascular: Normal rate and regular rhythm.  normal heart sounds and intact distal pulses.   Pulmonary/Chest: Breath sounds normal. No stridor. No respiratory distress. She has no wheezes. She has no rhonchi. She has no rales.   Abdominal: Soft. Bowel sounds are normal. There is no tenderness.   LLQ healed incision from previous renal allograft removal   Musculoskeletal: Normal range of motion. She exhibits no edema.   Neurological: She is alert and oriented to person, place, and time.   Skin: Skin is warm and dry.   L thigh graft good bruit   Psychiatric: She has a normal mood and affect.     MELD-Na score: 20 at 7/28/2018  3:46 AM  MELD score: 20 at 7/28/2018  3:46  AM  Calculated from:  Serum Creatinine: 5.6 mg/dL (Rounded to 4) at 7/28/2018  3:46 AM  Serum Sodium: 137 mmol/L at 7/28/2018  3:46 AM  Total Bilirubin: 0.6 mg/dL (Rounded to 1) at 7/26/2018  8:14 PM  INR(ratio): 1 at 7/26/2018  3:49 AM  Age: 52 years    Significant Labs:  CBC:    Recent Labs  Lab 07/29/18  0345 07/30/18  0421   WBC 4.72 4.64   HGB 8.7* 8.4*   HCT 27.4* 26.9*   * 135*     CMP:    Recent Labs  Lab 07/29/18  0345 07/30/18  0421   * 134*   K 4.5 4.6    101   CO2 22* 22*   GLU 60* 52*   BUN 17 22*   CREATININE 7.1* 8.9*   CALCIUM 7.9* 7.9*   ALBUMIN 2.3* 2.4*   ANIONGAP 10 11   EGFRNONAA 6.0* 4.6*     PTINR:  No results for input(s): INR in the last 48 hours.    Significant Procedures:   Dobutamine Stress Test with Color Flow: No results found for this or any previous visit.    None

## 2018-07-30 NOTE — PLAN OF CARE
Pt to d/c today with HH. Sw met with pt-no preference for HH provider. Pt needs ride home. SW attempted to set up transport through Medicaid transportation. Info provided to Medicaid transport but can not guarantee pt will be able to be picked up today. ZENAIDA arranged ride through [facility] for a 5pm .    HH referral sent to [facility]. [facility] accepted pt and will admit her tomorrow.    Xiomy Paz, [facility] [id number]

## 2018-07-30 NOTE — PROGRESS NOTES
[facility] NOTE     Patient currently on hemodialysis for removal of uremic toxins .     Patient seen and evaluated on hemodialysis, tolerating treatment, see HD flowsheet for vitals and assessments.      No Hypotension, chest pain, shortness of breath, cramping, nausea or vomiting. Denies back and abdominal pain.     L fem AVG no issues Qb 300  160 (400 on Fri). Possible positional-Reassess next HD tx  Very poor intake  Aim 3.5 hr for clearane No UF     .Celeste Ricks NP  Nephrology

## 2018-07-30 NOTE — PROGRESS NOTES
Pt ate peanut butter and crackers  Afterward blood sugar was 95. I called and reported thes finding to the dialysis staff who stated they would now send for patient and wound continue to monitor patient BS there.

## 2018-07-30 NOTE — PROGRESS NOTES
[facility] Hospital Medicine  Progress Note    Patient Name: Tony De Leon  MRN: [id number]  Patient Class: IP- Inpatient   Admission Date: 7/25/2018  Length of Stay: 5 days  Attending Physician: Gallo Mattson MD  Primary Care Provider: [facility] Doctor [facility] Medicine Team: [facility] HOSP MED A Gallo Mattson MD    Subjective:     Principal Problem:Endoleak of aortic graft    HPI:  This is a 51 yo F with hypertension, PAD, current 1/2 pack a day smoker, ESRD over 10 yrs sp failed kidney transplant now on HD, and recent admission here for AAA sp EVAR on 7/16 who initially presented to [facility] in [place], [place] for lower back pain not associated with abdominal pain/N/V x 2-3 days in which CT showing aneurysm leak then transferred to [facility] for Vascular Surgery.  evaluation. Per OSH records, patient was found wandering in her yard when EMS arrived. Found to be severely hypoglycemic (20s) and reported to have improvement in her mental status after receiving glucose. However, OSH CT scan was concerning for aneurysmal expansion so she was transferred to [facility] ED for further evaluation. Vascular surgery evaluated, clinically she is asymptomatic from the AAA for now.  She still have flow into the saccular AAA. Vascular surgery team spoke with  twice (in early AM and in PM).  Also, spoke with daughter, Gillian (RN, [phone]).  Family is clear now that they do not want another surgery or procedure - even if minimally invasive.  Admit to hospital medicine for monitoring and eval for hospice.     She has had subjective weight loss over the year. But since her surgery she has not been doing much, per records, pt has a hx of non-compliance and last dialysis was 7/20. MWF HD. Of note, she has a hx of a prior renal transplant from [id number], which had to be removed secondary to infection, SBP (while on PD), CHF. Surgical hx significant for PD catheter, renal transplant and explant,  tubal ligation, IVC filter unknown when placed. Per previous admission notes, she is unable to walk a block or up a flight of stairs.  No known MI/stroke. Also is noncompliant with her medications at home. On norvasc, lisinopril, and labetolol.     Hospital Course:  No notes on file    Interval History: no abdominal pain today.  BP is better. HD today, eval post HD and D/C home    Review of Systems  Constitutional: Positive for activity change,  fatigue. Negative for chills, diaphoresis and fever.   HENT: Negative for sore throat.    Respiratory: Negative for shortness of breath.    Cardiovascular: Negative for chest pain.   Gastrointestinal: Negative for abdominal pain and nausea.   Genitourinary: Negative for dysuria and pelvic pain.   Musculoskeletal: Positive for back pain (lower; not currently)   Skin: Negative for rash.   Neurological: Negative for weakness and headaches.   Hematological: Does not bruise/bleed easily.      Objective:     Vital Signs (Most Recent):  Temp: 98.6 °F (37 °C) (07/30/18 0800)  Pulse: 74 (07/30/18 0820)  Resp: 16 (07/30/18 0800)  BP: 121/69 (07/30/18 0820)  SpO2: 98 % (07/30/18 0405) Vital Signs (24h Range):  Temp:  [97.5 °F (36.4 °C)-98.8 °F (37.1 °C)] 98.6 °F (37 °C)  Pulse:  [70-80] 74  Resp:  [16] 16  SpO2:  [92 %-98 %] 98 %  BP: (110-131)/(68-86) 121/69     Weight: 45.8 kg (100 lb 15.5 oz)  Body mass index is 16.8 kg/m².  No intake or output data in the 24 hours ending 07/30/18 0856   Physical Exam  Constitutional: She is oriented to person, place, and time. No distress. Thin and frail .   Cardiovascular: Normal rate and regular rhythm.  normal heart sounds and intact distal pulses.   Pulmonary/Chest: Breath sounds normal. No stridor. No respiratory distress. She has no wheezes. She has no rhonchi. She has no rales.   Abdominal: Soft. Bowel sounds are normal. There is no tenderness.   LLQ healed incision from previous renal allograft removal   Musculoskeletal: Normal range of  motion. She exhibits no edema.   Neurological: She is alert and oriented to person, place, and time.   Skin: Skin is warm and dry.   L thigh graft good bruit   Psychiatric: She has a normal mood and affect.     MELD-Na score: 20 at 7/28/2018  3:46 AM  MELD score: 20 at 7/28/2018  3:46 AM  Calculated from:  Serum Creatinine: 5.6 mg/dL (Rounded to 4) at 7/28/2018  3:46 AM  Serum Sodium: 137 mmol/L at 7/28/2018  3:46 AM  Total Bilirubin: 0.6 mg/dL (Rounded to 1) at 7/26/2018  8:14 PM  INR(ratio): 1 at 7/26/2018  3:49 AM  Age: 52 years    Significant Labs:  CBC:    Recent Labs  Lab 07/29/18 0345 07/30/18 0421   WBC 4.72 4.64   HGB 8.7* 8.4*   HCT 27.4* 26.9*   * 135*     CMP:    Recent Labs  Lab 07/29/18 0345 07/30/18 0421   * 134*   K 4.5 4.6    101   CO2 22* 22*   GLU 60* 52*   BUN 17 22*   CREATININE 7.1* 8.9*   CALCIUM 7.9* 7.9*   ALBUMIN 2.3* 2.4*   ANIONGAP 10 11   EGFRNONAA 6.0* 4.6*     PTINR:  No results for input(s): INR in the last 48 hours.    Significant Procedures:   Dobutamine Stress Test with Color Flow: No results found for this or any previous visit.    None    Assessment/Plan:      * Endoleak of aortic graft    - hx of AAA s/p EVAR 7/16/18 readmitted with AMS and found to have endoleak. Unfortunately, due to her multiple medical co-morbidities she is not a candidate for open surgical repair and due to her anatomy is not a candidate for endovascular intervention. Discussed with Dr Westfall, she is asymptomatic from the AAA for now.  She does still have flow into the saccular AAA; this may or may not be able to be treated with a longer aortic cuff (70mm). He spoke with  twice and spoke with daughter, Gillian (RN, [phone]).    - Family is clear now that they do not want another surgery or procedure - even if minimally invasive. They want hospice care once she goes home.  - Family still want HD  - abd pain is off and on, mostly with HD given elevated blood pressure  - cbc  daily is stable  - will need pain controll  - confirmed family does not want surgical intervention         Essential hypertension    - suspect pt with BP elevation in HD that is causing her intense abd pain  - Adjusting regiment: hydralazine, lisinopril and amlodipine  - CTM her BP           Palliative care encounter    - Discussed with daughter, Susanna Gomes (daughter), [phone], in agreement of no intervention. She and her family would like that Ms robles get HD (she often only goes once a week), I discussed with Ms Gomes I suspect her abdominal pain in HD is due to her blood pressure being elevated at that time. I am trying to make sure her blood pressure regiment is better  - Family is in agreement of hospice but given that they want HD, they would like to take her home. Daughter states that all the pt wants is to be comfortable at home. We will respect their wishes, our goal is to get her home.  - Daughter report that her mom does not like the hospital food and at home she has very good appetite eating fast food.   - I discussed with her the possibility of outpatient palliative care to follow up on patient and family so when they decide that they no longer wants HD they can get hospice care, she would like this if possible, I will ask palliative care team if this is an option  - Discussed with Palliative Care, [place] is from [place] unable to get palliative care outpatient follow up.          Lytic bone lesion of hip    - CTA Abd: X2 noted. There is a predominantly lytic focus at the anterior right sacroiliac joint which is well circumscribed and demonstrates marginal sclerosis  - No hypercalcemia  - unclear if back pain is due to this  - Pt does not produce Urine, checking SPEP but does not expect hematological as the cause of her FTT and weakness            Adult failure to thrive    Moderate protein-calorie malnutrition  - Chronic per family report, pt report ~ 1 year of weight loss, weakness  - pt with  hypoglycemia, lethargy, decreased po intake worsening since her surgery  - No sign of infection, no sign of Major bleeding as Hgb is stable but anemia can play a role in her FFT  - Checking TSH, cortisol, PTH  - PT/OT consulted: rec home only  - CTA abd 2x has not shown any oncological masses/ hip lytic lesion noted (see below)  - start pt on Megace trial              ESRD (end stage renal disease)    - ESRD over 10 yrs sp failed kidney transplant after 5 years then 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 Previously onperitoneal dialysis but transition to iHD due to peritonitis.    - HD MWF,  Last HD Monday 7/23. No residual renal function.  - HD per nephro  - Renal diet   - Sevelamer WM        Hypoglycemia    - present on last admission as well  - Due to poor oral intake  - No hx of DM   - negative Cpeptide, insulin level, Cortisol level, TSH          Peripheral arterial disease    - hold asa for now given concern for bleeding            VTE Risk Mitigation         Ordered     Place sequential compression device  Until discontinued      07/25/18 1240     IP VTE HIGH RISK PATIENT  Once      07/25/18 1240              Gallo Mattson MD  Department of Hospital Medicine   [facility]

## 2018-07-30 NOTE — ASSESSMENT & PLAN NOTE
- Discussed with daughter, Susanna Gomes (daughter), [phone], in agreement of no intervention. She and her family would like that Ms robles get HD (she often only goes once a week), I discussed with Ms Gomes I suspect her abdominal pain in HD is due to her blood pressure being elevated at that time. I am trying to make sure her blood pressure regiment is better  - Family is in agreement of hospice but given that they want HD, they would like to take her home. Daughter states that all the pt wants is to be comfortable at home. We will respect their wishes, our goal is to get her home.  - Daughter report that her mom does not like the hospital food and at home she has very good appetite eating fast food.   - I discussed with her the possibility of outpatient palliative care to follow up on patient and family so when they decide that they no longer wants HD they can get hospice care, she would like this if possible, I will ask palliative care team if this is an option  - Discussed with Palliative Care, pt is from [place] unable to get palliative care outpatient follow up.

## 2018-07-30 NOTE — PLAN OF CARE
See yaneli edge's notes.  She arranged hh and transport.  Dr fierro requested that I call daughter, ms melo, to make sure pt has access to house.  Ms melo was informed of pt's p/u time and she confimed that pt will be able to get into house.    Ms. Melo was notified to make pt a PCP appt in [place]>     07/30/18 1413   Final Note   Assessment Type Final Discharge Note   Discharge Disposition Home-Health   Hospital Follow Up  Appt(s) scheduled? Yes   Discharge plans and expectations educations in teach back method with documentation complete? Yes   Right Care Referral Info   Post Acute Recommendation Home-care

## 2018-07-30 NOTE — DISCHARGE INSTRUCTIONS
Prescriptions given for Glucometer and testing supplies. TO check for LOW BLOOD GLUCOSE. WHEN LOW <60, please eat some candy, crackers, sweets things or drink Juice.

## 2018-07-30 NOTE — PROGRESS NOTES
Report received from FALLON Vera. Pt arrived from floor AAOx4. maintenance dialysis initiated via L thigh graft with 16G needles without difficulty.

## 2018-07-30 NOTE — NURSING
Discharge instructions and prescriptions given to patient.  Patient verbalized understanding and had no further questions.  Patient's IV removed and vital signs within normal limits.  Patient now awaiting for [facility]'s transportation at approximately 5pm.

## 2018-07-30 NOTE — ASSESSMENT & PLAN NOTE
- hx of AAA s/p EVAR 7/16/18 readmitted with AMS and found to have endoleak. Unfortunately, due to her multiple medical co-morbidities she is not a candidate for open surgical repair and due to her anatomy is not a candidate for endovascular intervention. Discussed with Dr Westfall, she is asymptomatic from the AAA for now.  She does still have flow into the saccular AAA; this may or may not be able to be treated with a longer aortic cuff (70mm). He spoke with  twice and spoke with daughter, Gillian (RN, [phone]).    - Family is clear now that they do not want another surgery or procedure - even if minimally invasive. They want hospice care once she goes home.  - Family still want HD  - abd pain is off and on, mostly with HD given elevated blood pressure  - cbc daily is stable  - will need pain controll  - confirmed family does not want surgical intervention

## 2018-07-30 NOTE — PROGRESS NOTES
3.5hr HD treatment stopped 25 minutes early per pt request. RON Ricks NP notified. Both needles of a L thigh graft removed and pressure held until hemostasis achieved dressing applied. Report given to FALLON Seay.

## 2018-07-30 NOTE — PLAN OF CARE
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       No future appointments.    I have seen and examined this patient face to face today. My clinical findings that support the need for the home health skilled services and home bound status are the following:  Weakness/numbness causing balance and gait disturbance due to Heart Failure, Weakness/Debility and Anemia making it taxing to leave home.  Requiring assistive device to leave home due to unsteady gait caused by  Heart Failure, Weakness/Debility, Anemia and Surgery.  Patient with medication mismanagement issues requiring home bound status as evidenced by  Poor understanding of medication regimen/dosage.    Allergies:Review of patient's allergies indicates:  No Known Allergies    Diet: regular diet and renal diet    Activities: activity as tolerated    Nursing:   SN to complete comprehensive assessment including routine vital signs. Instruct on disease process and s/s of complications to report to MD. Review/verify medication list sent home with the patient at time of discharge  and instruct patient/caregiver as needed. Frequency  may be adjusted depending on start of care date.    Notify MD if SBP > 160 or < 90; DBP > 90 or < 50; HR > 120 or < 50; Temp > 101       CONSULTS:    Physical Therapy to evaluate and treat. Evaluate for home safety and equipment needs; Establish/upgrade home exercise program. Perform / instruct on therapeutic exercises, gait training, transfer training, and Range of Motion.  Occupational Therapy to evaluate and treat. Evaluate home environment for safety and equipment needs. Perform/Instruct on transfers, ADL training, ROM, and therapeutic exercises.   to evaluate for community resources/long-range planning.  Aide to provide assistance with personal care, ADLs, and vital signs.    MISCELLANEOUS CARE:  Routine Skin for Bedridden Patients: Instruct patient/caregiver to apply moisture barrier cream to all skin folds and wet areas in perineal area daily and after baths and all bowel movements.  Diabetic Care:   SN to perform and educate Diabetic management with blood glucose monitoring: and Report CBG < 60 or > 350 to physician.        Hypoglycemia     Due to poor oral intake  Give sugar containing product if glucose is <60, (ie orange juice, cracker, candy)       WOUND CARE ORDERS  n/a      Medications: Review discharge medications with patient and family and provide education.      Current Discharge Medication List      CONTINUE these medications which have CHANGED    Details   amLODIPine (NORVASC) 10 MG tablet Take 1 tablet (10 mg total) by mouth once daily.  Qty: 30 tablet, Refills: 11      HYDROcodone-acetaminophen (NORCO) 5-325 mg per tablet Take 1 tablet by mouth every 6 (six) hours as needed for Pain.  Qty: 30 tablet, Refills: 0         CONTINUE these medications which have NOT CHANGED    Details   atorvastatin (LIPITOR) 40 MG tablet Take 1 tablet (40 mg total) by mouth once daily.  Qty: 90 tablet, Refills: 3      cloNIDine (CATAPRES) 0.3 MG tablet Take 0.3 mg by mouth 2 (two) times daily.       hydrOXYzine HCl (ATARAX) 25 MG tablet Take 25 mg by mouth 3 (three) times daily.      lisinopril (PRINIVIL,ZESTRIL) 40 MG tablet Take 40 mg by mouth once daily.      metoprolol succinate (TOPROL-XL) 100 MG 24 hr tablet Take 100 mg by mouth once daily.      pantoprazole (PROTONIX) 40 MG tablet Take 1 tablet (40 mg total) by mouth once daily.  Qty: 30 tablet, Refills: 11      sevelamer carbonate (RENVELA) 800 mg Tab Take 1 tablet (800 mg total) by mouth 3 (three) times daily with meals.  Qty: 90 tablet, Refills: 11         STOP taking these medications       aspirin (ECOTRIN) 81 MG EC tablet Comments:   Reason for Stopping:               I certify that this patient is confined to her home and needs intermittent skilled nursing care, physical therapy and occupational therapy.    Signing Physician     Gallo Mattson MD  Hospital Medicine Staff  Department of Hospital Medicine   [facility]  7/30/2018 11:11 AM

## 2018-07-30 NOTE — ASSESSMENT & PLAN NOTE
- present on last admission as well  - Due to poor oral intake  - No hx of DM   - negative Cpeptide, insulin level, Cortisol level, TSH

## 2018-07-30 NOTE — PLAN OF CARE
"Dc plan per huddle report is hh/hd  Dr fierro notified kely that pt can't get aim/pc so he will write hh orders.    No future appointments.     Per [facility] notes, "Discussed patient with Dr. Westfall. Unfortunately, due to her multiple medical co-morbidities she is not a candidate for open surgical repair and due to her anatomy is not a candidate for endovascular intervention. "    Pt will f/u with her PCP at home. Cm will notify daughter, Ms. Gomes, to make this appt     07/30/18 1308   Right Care Assessment   Can the patient answer the patient profile reliably? (pt not in room upon rounds at 0920)   How often would a person be available to care for the patient? Occasionally   Describe the patient's ability to walk at the present time. Minor restrictions or changes   How does the patient rate their overall health at the present time? Fair   Number of comorbid conditions (as recorded on the chart) Three   During the past month, has the patient often been bothered by feeling down, depressed or hopeless? No   Have you felt down, depressed, or hopeless? Unable to Assess   During the past month, has the patient often been bothered by little interest or pleasure in doing things? No   Have you felt little interest or pleasure in doing things? Unable to assess     "

## 2018-07-31 NOTE — DISCHARGE SUMMARY
[facility] Hospital Medicine  Discharge Summary      Patient Name: Tony De Leon  MRN: [id number]  Admission Date: 7/25/2018  Hospital Length of Stay: 5 days  Discharge Date and Time: 7/30/2018  7:26 PM  Attending Physician: Gladys att. providers found   Discharging Provider: Gallo Mattson MD  Primary Care Provider: Primary Doctor Gladys  [facility] Medicine Team: [facility] A Gallo Mattson MD    HPI:   This is a 51 yo F with hypertension, PAD, current 1/2 pack a day smoker, ESRD over 10 yrs sp failed kidney transplant now on HD, and recent admission here for AAA sp EVAR on 7/16 who initially presented to [facility] in [place], [place] for lower back pain not associated with abdominal pain/N/V x 2-3 days in which CT showing aneurysm leak then transferred [place] [facility].  evaluation. Per OSH records, patient was found wandering in her yard when EMS arrived. Found to be severely hypoglycemic (20s) and reported to have improvement in her mental status after receiving glucose. However, OSH CT scan was concerning for aneurysmal expansion so she was transferred to [facility] for further evaluation. Vascular surgery evaluated, clinically she is asymptomatic from the AAA for now.  She still have flow into the saccular AAA. Vascular surgery team spoke with  twice (in early AM and in PM).  Also, spoke with daughter, Gillian (RN, [phone]).  Family is clear now that they do not want another surgery or procedure - even if minimally invasive.  Admit to hospital medicine for monitoring and eval for hospice.     She has had subjective weight loss over the year. But since her surgery she has not been doing much, per records, pt has a hx of non-compliance and last dialysis was 7/20. MWF HD. Of note, she has a hx of a prior renal transplant from 0057-5113, which had to be removed secondary to infection, SBP (while on PD), CHF. Surgical hx significant for PD catheter, renal transplant  and explant, tubal ligation, IVC filter unknown when placed. Per previous admission notes, she is unable to walk a block or up a flight of stairs.  No known MI/stroke. Also is noncompliant with her medications at home. On norvasc, lisinopril, and labetolol.     * No surgery found *      Hospital Course:   * Endoleak of aortic graft     - hx of AAA s/p EVAR 7/16/18 readmitted with AMS and found to have endoleak. Unfortunately, due to her multiple medical co-morbidities she is not a candidate for open surgical repair and due to her anatomy is not a candidate for endovascular intervention. Discussed with Dr Westfall, she is asymptomatic from the AAA for now.  She does still have flow into the saccular AAA; this may or may not be able to be treated with a longer aortic cuff (70mm). He spoke with  twice and spoke with daughter, Gillian (RN, [phone]).    - Family is clear now that they do not want another surgery or procedure - even if minimally invasive. They want hospice care once she goes home.  - Family still want HD  - abd pain is off and on, mostly with HD given elevated blood pressure, BP med were adjusted  - will need pain controlled for home  - confirmed family does not want surgical intervention   - stable CBC will D/C home             Palliative care encounter     - Discussed with daughter, Susanna Gomes (daughter), [phone], in agreement of no intervention. She and her family would like that Ms robles get HD (she often only goes once a week), I discussed with Ms Gomes I suspect her abdominal pain in HD is due to her blood pressure being elevated at that time. I am trying to make sure her blood pressure regiment is better  - Family is in agreement of hospice but given that they want HD, they would like to take her home. Daughter states that all the pt wants is to be comfortable at home. We will respect their wishes, our goal is to get her home.  - Daughter report that her mom does not like the  hospital food and at home she has very good appetite eating fast food.   - I discussed with her the possibility of outpatient palliative care to follow up on patient and family so when they decide that they no longer wants HD they can get hospice care, she would like this if possible, I will ask palliative care team if this is an option  - Discussed with Palliative Care, pt is from [place] so unable to get palliative care outpatient follow up.  - D/C with home health             Lytic bone lesion of hip     - CTA Abd: X2 noted. There is a predominantly lytic focus at the anterior right sacroiliac joint which is well circumscribed and demonstrates marginal sclerosis  - No hypercalcemia  - unclear if back pain is due to this  - Pt does not produce Urine, checking SPEP: unremarkable                Adult failure to thrive     Moderate protein-calorie malnutrition  - Chronic per family report, pt report ~ 1 year of weight loss, weakness  - pt with hypoglycemia, lethargy, decreased po intake worsening since her surgery  - No sign of infection, no sign of Major bleeding as Hgb is stable but anemia can play a role in her FFT  - PT/OT consulted: rec home only  - CTA abd 2x has not shown any oncological masses/ hip lytic lesion noted (see below)  - start pt on Megace trial          ESRD (end stage renal disease)     - ESRD over 10 yrs sp failed kidney transplant after 5 years then 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 Previously onperitoneal dialysis but transition to iHD due to peritonitis.    - HD MWF,  - HD per nephro  - Renal diet   - Sevelamer WM          Hypoglycemia     - present on last admission as well  - Due to poor oral intake  - No hx of DM   - negative Cpeptide, insulin level, Cortisol level, TSH    - educated pt on signs of hypoglycemia, info given for family at home  - glucometer prescription given          Peripheral arterial disease     - hold asa for now given concern for bleeding               Consults:    Consults         Status Ordering Provider     Inpatient consult to Midline team  Once     Provider:  (Not yet assigned)    Completed DANIEL ESQUIVEL     Inpatient consult to Nephrology  Once     Provider:  (Not yet assigned)    Completed DANYELL TEMPLETON     Inpatient consult to Palliative Care  Once     Provider:  (Not yet assigned)    Completed DANIEL ESQUIVEL     Inpatient consult to Vascular Surgery  Once     Provider:  (Not yet assigned)    Completed LIAT HERNÁNDEZ          No new Assessment & Plan notes have been filed under this hospital service since the last note was generated.  Service: Hospital Medicine    Final Active Diagnoses:    Diagnosis Date Noted POA    PRINCIPAL PROBLEM:  Endoleak of aortic graft [T82.330A] 07/25/2018 Yes    Essential hypertension [I10] 07/25/2018 Yes    Palliative care encounter [Z51.5] 07/28/2018 Not Applicable    Hypoglycemia [E16.2] 07/25/2018 Yes    ESRD (end stage renal disease) [N18.6] 07/25/2018 Yes    Adult failure to thrive [R62.7] 07/25/2018 Yes    Lytic bone lesion of hip [M89.8X5] 07/25/2018 Yes    Moderate protein-calorie malnutrition [E44.0] 07/17/2018 Yes    AAA (abdominal aortic aneurysm) [I71.4] 07/16/2018 Yes    Peripheral arterial disease [I73.9] 07/16/2018 Yes      Problems Resolved During this Admission:    Diagnosis Date Noted Date Resolved POA       Discharged Condition: stable    [place]position[place] [facility]    Follow Up:  Follow-up Information     Call in 2 weeks to follow up.    Why:  Contact your primary care doctor for follow up           Please follow up.    Why:  Go to [facility] for routine dialysis           [facility].    Contact information:  [phone]                Patient Instructions:     Diet renal     Notify your health care provider if you experience any of the following:  persistent nausea and vomiting or diarrhea     Notify your health care provider if you experience any of the following:   severe uncontrolled pain     Activity as tolerated         Significant Diagnostic Studies: Labs:   CMP   Recent Labs  Lab 07/30/18  0421   *   K 4.6      CO2 22*   GLU 52*   BUN 22*   CREATININE 8.9*   CALCIUM 7.9*   ALBUMIN 2.4*   ANIONGAP 11   ESTGFRAFRICA 5.3*   EGFRNONAA 4.6*   , CBC   Recent Labs  Lab 07/30/18  0421   WBC 4.64   HGB 8.4*   HCT 26.9*   *   , INR   Lab Results   Component Value Date    INR 1.0 07/26/2018    INR 1.1 07/25/2018    INR 1.0 07/16/2018    and A1C:   Recent Labs  Lab 07/26/18  0349   HGBA1C 4.3       Imaging Results          CTA Abdomen and Pelvis (Final result)     Abnormal  Result time 07/25/18 04:20:56   Procedure changed from CTA Chest Abdomen Pelvis     Final result by Curt Nelson MD (07/25/18 04:20:56)                 Impression:      Postoperative change of endovascular aortic aneurysm repair of the infrarenal abdominal aorta with contrast extravasation along the right lateral aspect of the graft into a retroperitoneal hematoma as detailed above.  Findings are worrisome for endoleak, possibly type 3 or 4.  Recommend vascular surgery evaluation and correlation with outside imaging, as dedicated endoleak protocol was not performed.    Additional focus of persistent aortic rupture versus pseudoaneurysm along the right lateral aspect of the infrarenal abdominal aorta appears distal to the graft material.    Extensive iliofemoral vascular calcifications with bilateral femoral bypass grafts.  Right graft is occluded.    IVC filter with stenosis of the IVC just caudal to the filter.  Prominent opacification of the lower IVC and iliac veins suggest the presence of arteriovenous fistula.    Additional stable findings as above.    This report was flagged in Epic as abnormal.    Electronically signed by resident: Juaquin Mcbride  Date:    07/25/2018  Time:    03:03    Electronically signed by: Curt Nelson MD  Date:    07/25/2018  Time:    04:20              Narrative:    EXAMINATION:  CTA ABDOMEN AND PELVIS    CLINICAL HISTORY:  Abdominal aortic aneurysm (AAA), known, follow up;evaluate for endoleak;    TECHNIQUE:  Using 75 cc of Omnipaque 350 IV contrast, and multi-detector helical CT technique, axial CT angiogram images of the abdomen were obtained from the lung bases through the pelvis. Additionally, precontrast and portal venous phase images of the abdomen and pelvis also performed.  2D post-processing coronal and sagittal reconstructions of the abdominal aorta and visceral arteries performed.    COMPARISON:  CTA abdomen and pelvis 07/16/2018.    FINDINGS:  Heart: No cardiomegaly or pericardial effusion.There is dense calcification of the aortic valve as well as the mitral valve apparatus.  Abundant radiopaque material is present within the coronary arteries.    Lung Bases: Lungs are symmetrically expanded and demonstrate moderate centrilobular emphysema.    Liver: Normal in size and attenuation without focal hepatic abnormality.    Gallbladder: Surgically absent.    Bile Ducts: No intra or extrahepatic biliary ductal dilation.    Pancreas: No pancreatic mass lesion or peripancreatic inflammatory change.    Spleen: Not enlarged..    Adrenals: No focal mass.    Genitourinary: There is severe bilateral renal atrophy, noting inferior displacement of the left kidney, similar in appearance to the prior exam.No renal concentration of contrast is appreciated.  No right hydroureteronephrosis.  Calcified mass in the left lower quadrant adjacent to the iliac vessels may reflect a nonfunctioning renal transplant.  Urinary bladder is relatively decompressed but otherwise unremarkable.    Reproductive organs: Multiple tortuous, dilated venous structures are again present in the expected region of the uterus.  No pelvic mass identified.    GI tract/Mesentery: Stomach is normal.  Visualized loops of small and large bowel are normal in caliber without evidence for inflammation or  obstruction.High-density material is present throughout both small and large bowel, possibly related to outside CT scan.    Peritoneal Space: No abdominopelvic ascites or intraperitoneal free air.    Retroperitoneum: No significant adenopathy.    Abdominal wall: Normal.    Vasculature:    There is fusiform dilation of the suprarenal abdominal aorta measuring up to 3.0 cm in greatest diameter (axial series 4, image 36).  There is interval postoperative change of endovascular aortic aneurysmal repair extending from the level of the superior mesenteric artery to approximately 2.5 cm above the aortic bifurcation.  However, there is a persistent focus of contrast extravasation along the right lateral aspect of the graft at the level of the L3 vertebral body concerning for type 3 or type 4 endoleak.  Contrast extravasates into a retroperitoneal hematoma containing mural thrombus which may represent pseudo aneurysm or contained rupture.  This structure is grossly stable in size from prior examination dated 07/16/2018 measuring 4.9 x 4.6 cm (previously 4.5 x 4.2 cm).  Internal component of active extravasation measures 2.4 cm (previously 2.5 cm).  Additionally, there is a 2nd focus of contrast extravasation along the right lateral aspect of the infrarenal abdominal aorta just caudal to the distal aspect of the aortic graft which is unchanged in size measuring 1.1 x 0.7 cm (previously 1.1 x 1.1 cm).  No new areas of contrast extravasation identified.    There is extensive vascular calcification involving the iliac and femoral arteries bilaterally.  Femoral artery bypass grafts are present bilaterally, left patent and right occluded.  Mesenteric vessels and right renal artery appear patent noting prominent ostial calcifications.    There is an infrarenal IVC filter in place with stenosis of the IVC just caudal to the stent.  Lower IVC and iliac veins demonstrate dense contrast opacification suggesting arteriovenous  fistula.    Bones: Osseous structures demonstrate age-appropriate degenerative change.  No evidence for acute fracture.  Lytic structure within the iliac component of the right sacroiliac joint again noted.                                Medications:  Reconciled Home Medications:      Medication List      START taking these medications    blood sugar diagnostic Strp  1 strip by Misc.(Non-Drug; Combo Route) route 2 (two) times daily with meals.     blood-glucose meter kit  Use as instructed     lancets Misc  1 lancet by Misc.(Non-Drug; Combo Route) route 2 (two) times daily with meals.     lancing device Misc  1 Device by Misc.(Non-Drug; Combo Route) route 2 (two) times daily with meals.        CHANGE how you take these medications    amLODIPine 10 MG tablet  Commonly known as:  NORVASC  Take 1 tablet (10 mg total) by mouth once daily.  What changed:  · medication strength  · how much to take     HYDROcodone-acetaminophen 5-325 mg per tablet  Commonly known as:  NORCO  Take 1 tablet by mouth every 6 (six) hours as needed for Pain.  What changed:  · when to take this  · reasons to take this        CONTINUE taking these medications    atorvastatin 40 MG tablet  Commonly known as:  LIPITOR  Take 1 tablet (40 mg total) by mouth once daily.     cloNIDine 0.3 MG tablet  Commonly known as:  CATAPRES  Take 0.3 mg by mouth 2 (two) times daily.     hydrOXYzine HCl 25 MG tablet  Commonly known as:  ATARAX  Take 25 mg by mouth 3 (three) times daily.     lisinopril 40 MG tablet  Commonly known as:  PRINIVIL,ZESTRIL  Take 40 mg by mouth once daily.     metoprolol succinate 100 MG 24 hr tablet  Commonly known as:  TOPROL-XL  Take 100 mg by mouth once daily.     pantoprazole 40 MG tablet  Commonly known as:  PROTONIX  Take 1 tablet (40 mg total) by mouth once daily.     sevelamer carbonate 800 mg Tab  Commonly known as:  RENVELA  Take 1 tablet (800 mg total) by mouth 3 (three) times daily with meals.        STOP taking these  medications    aspirin 81 MG EC tablet  Commonly known as:  ECOTRIN            Indwelling Lines/Drains at time of discharge:   Lines/Drains/Airways     Drain                 Hemodialysis AV Fistula  -- days         Hemodialysis AV Fistula Left thigh -- days                Time spent on the discharge of patient: >30 minutes  Patient was seen and examined on the date of discharge and determined to be suitable for discharge.         Gallo Mattson MD  Department of Hospital Medicine  [facility]

## 2018-08-01 LAB
BACTERIA BLD CULT: NORMAL
BACTERIA BLD CULT: NORMAL

## 2018-12-03 NOTE — PROGRESS NOTES
03-Dec-2018 06:21 HD treatment complete. Duration of treatment 3 hours and 15 minutes. Treatment was tolerated well and no complications with access to left thigh. Needles removed and hemostasis achieved. Dressing intact and no drainage noted. Thrill and bruit present.

## 2021-10-01 PROBLEM — T82.330A LEAKAGE OF AORTIC GRAFT: Status: ACTIVE | Noted: 2018-07-25

## 2024-07-10 NOTE — PLAN OF CARE
Goal Outcome Evaluation:                              Adequate for discharge.                 Problem: Patient Care Overview  Goal: Plan of Care Review  Outcome: Ongoing (interventions implemented as appropriate)   07/26/18 0854   Coping/Psychosocial   Plan Of Care Reviewed With patient

## 2024-10-06 NOTE — PLAN OF CARE
POC reviewed with patient and spouse who verbalized understanding. Pt is alert and following commands, but still drowsy. Pt is disoriented to time and place. No PRN blood pressure medications given, pt has been systolic 130-145. Pt transitioned to 4 L nasal cannula. Pt remained free from falls throughout the shift VSS. No distress noted, will continue to monitor.     4 = No assist / stand by assistance

## (undated) DEVICE — DEVICE CLOSURE MYNX GRIP 5FR

## (undated) DEVICE — SUT 2-0 12-18IN SILK

## (undated) DEVICE — TRAY FOLEY 16FR INFECTION CONT

## (undated) DEVICE — CLIP MED TICALL

## (undated) DEVICE — SEE MEDLINE ITEM 157131

## (undated) DEVICE — KIT INTRO MICRO NIT VSI 4FR

## (undated) DEVICE — DEVICE PERCLOSE SUT CLSR 6FR

## (undated) DEVICE — INTRODUCER VASC RADPQ 10FRX10C

## (undated) DEVICE — SPONGE GAUZE 16PLY 4X4

## (undated) DEVICE — TUBING HIGH PRESSURE

## (undated) DEVICE — STOPCOCK 1 WAY PLASTIC

## (undated) DEVICE — DRAPE STERI INSTRUMENT 1018

## (undated) DEVICE — CATH BLLN CODA 9FR 120CM

## (undated) DEVICE — APPLICATOR CHLORAPREP ORN 26ML

## (undated) DEVICE — BLLN CAT CODA 32MM

## (undated) DEVICE — SUT MCRYL PLUS 4-0 PS2 27IN

## (undated) DEVICE — SEE MEDLINE ITEM 153688

## (undated) DEVICE — SEE MEDLINE ITEM 152622

## (undated) DEVICE — LOOP VESSEL BLUE MAXI

## (undated) DEVICE — SYR ONLY LUER LOCK 20CC

## (undated) DEVICE — SYR 10CC LUER LOCK

## (undated) DEVICE — SUT 3-0 12-18IN SILK

## (undated) DEVICE — ELECTRODE REM PLYHSV RETURN 9

## (undated) DEVICE — DRESSING ABSRBNT ISLAND 3.6X8

## (undated) DEVICE — TUBING CNTRST INJ ADPT 72IN

## (undated) DEVICE — GUIDEWIRE STF .035X260CM ANG

## (undated) DEVICE — PACK DRAPE UNIVERSAL CONVERTOR

## (undated) DEVICE — DRESSING TRANS 4X4 TEGADERM

## (undated) DEVICE — GAUZE SPONGE 4X4 12PLY

## (undated) DEVICE — SYR MED RAD 150ML

## (undated) DEVICE — CATH ANGLED GLIDE CATH 4FR

## (undated) DEVICE — SUT 2-0 VICRYL / SH (J417)

## (undated) DEVICE — SET DECANTER MEDICHOICE

## (undated) DEVICE — CATH PIGTAIL

## (undated) DEVICE — INTRODUCER VASC RADPQ 6FRX10CM

## (undated) DEVICE — CANNULA VESSEL

## (undated) DEVICE — SEE MEDLINE ITEM 152487

## (undated) DEVICE — ADHESIVE DERMABOND ADVANCED

## (undated) DEVICE — SYR 50CC LL

## (undated) DEVICE — PENCIL ROCKER SWITCH 10FT CORD

## (undated) DEVICE — Device

## (undated) DEVICE — SUT VICRYL 3-0 27 SH

## (undated) DEVICE — STAPLER SKIN PROXIMATE WIDE

## (undated) DEVICE — HEMOSTAT SURGICEL 4X8IN

## (undated) DEVICE — WIRE GUIDE J .035 X 180CM

## (undated) DEVICE — SHEATH INTRODUCER 14FX28CM

## (undated) DEVICE — COVER INSTR ELASTIC BAND 40X20

## (undated) DEVICE — DRAPE INCISE IOBAN 2 23X33IN

## (undated) DEVICE — SUT PROLENE 5-0 24 C-1 BL

## (undated) DEVICE — WIRE LUNDERQUIST 260CM

## (undated) DEVICE — SUT SILK 2-0 SH 18IN BLACK

## (undated) DEVICE — CATH MARINER KUMPE 5FX65CM

## (undated) DEVICE — SOL NS 1000CC

## (undated) DEVICE — CATH ANGIO PGTL 5F 0.35 70CM

## (undated) DEVICE — INTRODUCER VASC RADPQ 5FRX10CM

## (undated) DEVICE — SUT 4-0 12-18IN SILK BLACK